# Patient Record
Sex: MALE | Race: WHITE | NOT HISPANIC OR LATINO | Employment: FULL TIME | URBAN - METROPOLITAN AREA
[De-identification: names, ages, dates, MRNs, and addresses within clinical notes are randomized per-mention and may not be internally consistent; named-entity substitution may affect disease eponyms.]

---

## 2023-02-09 PROBLEM — N20.0 KIDNEY STONES: Status: ACTIVE | Noted: 2023-02-09

## 2023-02-09 PROBLEM — G89.29 CHRONIC PAIN OF RIGHT ANKLE: Status: ACTIVE | Noted: 2023-02-09

## 2023-02-09 PROBLEM — B36.9 FUNGAL DERMATITIS: Status: ACTIVE | Noted: 2023-02-09

## 2023-02-09 PROBLEM — N20.1 RIGHT URETERAL STONE: Status: ACTIVE | Noted: 2023-02-09

## 2023-02-09 PROBLEM — M25.551 HIP PAIN, RIGHT: Status: ACTIVE | Noted: 2023-02-09

## 2023-02-09 PROBLEM — M25.571 CHRONIC PAIN OF RIGHT ANKLE: Status: ACTIVE | Noted: 2023-02-09

## 2023-02-09 PROBLEM — E66.812 CLASS 2 SEVERE OBESITY DUE TO EXCESS CALORIES WITH SERIOUS COMORBIDITY AND BODY MASS INDEX (BMI) OF 37.0 TO 37.9 IN ADULT: Status: ACTIVE | Noted: 2023-02-09

## 2023-02-09 PROBLEM — I10 BENIGN ESSENTIAL HYPERTENSION: Status: ACTIVE | Noted: 2023-02-09

## 2023-02-09 PROBLEM — E55.9 VITAMIN D DEFICIENCY: Status: ACTIVE | Noted: 2023-02-09

## 2023-02-09 PROBLEM — M54.40 LOW BACK PAIN WITH SCIATICA: Status: ACTIVE | Noted: 2023-02-09

## 2023-02-09 PROBLEM — E66.01 CLASS 2 SEVERE OBESITY DUE TO EXCESS CALORIES WITH SERIOUS COMORBIDITY AND BODY MASS INDEX (BMI) OF 37.0 TO 37.9 IN ADULT (MULTI): Status: ACTIVE | Noted: 2023-02-09

## 2023-02-09 RX ORDER — CLOTRIMAZOLE AND BETAMETHASONE DIPROPIONATE 10; .64 MG/G; MG/G
CREAM TOPICAL 2 TIMES DAILY
COMMUNITY

## 2023-02-09 RX ORDER — LISINOPRIL 10 MG/1
10 TABLET ORAL DAILY
COMMUNITY
End: 2023-04-18 | Stop reason: SDUPTHER

## 2023-03-13 LAB
ALANINE AMINOTRANSFERASE (SGPT) (U/L) IN SER/PLAS: 17 U/L (ref 10–52)
ALBUMIN (G/DL) IN SER/PLAS: 3.8 G/DL (ref 3.4–5)
ALKALINE PHOSPHATASE (U/L) IN SER/PLAS: 72 U/L (ref 33–120)
ANION GAP IN SER/PLAS: 8 MMOL/L (ref 10–20)
ASPARTATE AMINOTRANSFERASE (SGOT) (U/L) IN SER/PLAS: 14 U/L (ref 9–39)
BILIRUBIN TOTAL (MG/DL) IN SER/PLAS: 0.7 MG/DL (ref 0–1.2)
CALCIDIOL (25 OH VITAMIN D3) (NG/ML) IN SER/PLAS: 11 NG/ML
CALCIUM (MG/DL) IN SER/PLAS: 8.7 MG/DL (ref 8.6–10.3)
CARBON DIOXIDE, TOTAL (MMOL/L) IN SER/PLAS: 30 MMOL/L (ref 21–32)
CHLORIDE (MMOL/L) IN SER/PLAS: 106 MMOL/L (ref 98–107)
CHOLESTEROL (MG/DL) IN SER/PLAS: 144 MG/DL (ref 0–199)
CHOLESTEROL IN HDL (MG/DL) IN SER/PLAS: 36.5 MG/DL
CHOLESTEROL/HDL RATIO: 3.9
CREATININE (MG/DL) IN SER/PLAS: 0.97 MG/DL (ref 0.5–1.3)
ERYTHROCYTE DISTRIBUTION WIDTH (RATIO) BY AUTOMATED COUNT: 13 % (ref 11.5–14.5)
ERYTHROCYTE MEAN CORPUSCULAR HEMOGLOBIN CONCENTRATION (G/DL) BY AUTOMATED: 33.3 G/DL (ref 32–36)
ERYTHROCYTE MEAN CORPUSCULAR VOLUME (FL) BY AUTOMATED COUNT: 86 FL (ref 80–100)
ERYTHROCYTES (10*6/UL) IN BLOOD BY AUTOMATED COUNT: 5.63 X10E12/L (ref 4.5–5.9)
GFR MALE: >90 ML/MIN/1.73M2
GLUCOSE (MG/DL) IN SER/PLAS: 98 MG/DL (ref 74–99)
HEMATOCRIT (%) IN BLOOD BY AUTOMATED COUNT: 48.3 % (ref 41–52)
HEMOGLOBIN (G/DL) IN BLOOD: 16.1 G/DL (ref 13.5–17.5)
LDL: 80 MG/DL (ref 0–99)
LEUKOCYTES (10*3/UL) IN BLOOD BY AUTOMATED COUNT: 7.6 X10E9/L (ref 4.4–11.3)
PLATELETS (10*3/UL) IN BLOOD AUTOMATED COUNT: 269 X10E9/L (ref 150–450)
POTASSIUM (MMOL/L) IN SER/PLAS: 4.4 MMOL/L (ref 3.5–5.3)
PROTEIN TOTAL: 6.7 G/DL (ref 6.4–8.2)
SODIUM (MMOL/L) IN SER/PLAS: 140 MMOL/L (ref 136–145)
THYROTROPIN (MIU/L) IN SER/PLAS BY DETECTION LIMIT <= 0.05 MIU/L: 1.6 MIU/L (ref 0.44–3.98)
TRIGLYCERIDE (MG/DL) IN SER/PLAS: 136 MG/DL (ref 0–149)
UREA NITROGEN (MG/DL) IN SER/PLAS: 11 MG/DL (ref 6–23)
VLDL: 27 MG/DL (ref 0–40)

## 2023-03-17 ENCOUNTER — TELEPHONE (OUTPATIENT)
Dept: PRIMARY CARE | Facility: CLINIC | Age: 43
End: 2023-03-17
Payer: COMMERCIAL

## 2023-03-21 LAB
APPEARANCE, URINE: CLEAR
BILIRUBIN, URINE: NEGATIVE
BLOOD, URINE: NEGATIVE
COLOR, URINE: YELLOW
GLUCOSE, URINE: NEGATIVE MG/DL
KETONES, URINE: ABNORMAL MG/DL
LEUKOCYTE ESTERASE, URINE: NEGATIVE
NITRITE, URINE: NEGATIVE
PH, URINE: 6 (ref 5–8)
PROTEIN, URINE: NEGATIVE MG/DL
SPECIFIC GRAVITY, URINE: 1.02 (ref 1–1.03)
UROBILINOGEN, URINE: <2 MG/DL (ref 0–1.9)

## 2023-03-22 ENCOUNTER — OFFICE VISIT (OUTPATIENT)
Dept: PRIMARY CARE | Facility: CLINIC | Age: 43
End: 2023-03-22
Payer: COMMERCIAL

## 2023-03-22 VITALS
RESPIRATION RATE: 15 BRPM | BODY MASS INDEX: 37.49 KG/M2 | SYSTOLIC BLOOD PRESSURE: 132 MMHG | TEMPERATURE: 96.2 F | OXYGEN SATURATION: 96 % | WEIGHT: 225 LBS | HEART RATE: 76 BPM | DIASTOLIC BLOOD PRESSURE: 87 MMHG | HEIGHT: 65 IN

## 2023-03-22 DIAGNOSIS — M77.8 ENTHESOPATHY OF RIGHT FOOT: ICD-10-CM

## 2023-03-22 DIAGNOSIS — I10 BENIGN ESSENTIAL HYPERTENSION: Primary | ICD-10-CM

## 2023-03-22 DIAGNOSIS — E55.9 VITAMIN D DEFICIENCY: ICD-10-CM

## 2023-03-22 DIAGNOSIS — M77.31 HEEL SPUR, RIGHT: ICD-10-CM

## 2023-03-22 DIAGNOSIS — M54.40 CHRONIC RIGHT-SIDED LOW BACK PAIN WITH SCIATICA, SCIATICA LATERALITY UNSPECIFIED: ICD-10-CM

## 2023-03-22 DIAGNOSIS — G89.29 CHRONIC RIGHT-SIDED LOW BACK PAIN WITH SCIATICA, SCIATICA LATERALITY UNSPECIFIED: ICD-10-CM

## 2023-03-22 DIAGNOSIS — M47.817 FACET ARTHRITIS OF LUMBOSACRAL REGION: ICD-10-CM

## 2023-03-22 DIAGNOSIS — M25.571 CHRONIC PAIN OF RIGHT ANKLE: ICD-10-CM

## 2023-03-22 DIAGNOSIS — M16.11 ARTHRITIS OF RIGHT HIP: ICD-10-CM

## 2023-03-22 DIAGNOSIS — G89.29 CHRONIC PAIN OF RIGHT ANKLE: ICD-10-CM

## 2023-03-22 PROCEDURE — 3075F SYST BP GE 130 - 139MM HG: CPT | Performed by: FAMILY MEDICINE

## 2023-03-22 PROCEDURE — 3079F DIAST BP 80-89 MM HG: CPT | Performed by: FAMILY MEDICINE

## 2023-03-22 PROCEDURE — 1036F TOBACCO NON-USER: CPT | Performed by: FAMILY MEDICINE

## 2023-03-22 PROCEDURE — 99214 OFFICE O/P EST MOD 30 MIN: CPT | Performed by: FAMILY MEDICINE

## 2023-03-22 RX ORDER — ACETAMINOPHEN 500 MG
5000 TABLET ORAL DAILY
COMMUNITY

## 2023-03-22 ASSESSMENT — ENCOUNTER SYMPTOMS
SHORTNESS OF BREATH: 0
CHILLS: 0
CHEST TIGHTNESS: 0
ABDOMINAL PAIN: 0
FEVER: 0
PALPITATIONS: 0
ARTHRALGIAS: 0
CONFUSION: 0

## 2023-03-22 NOTE — PROGRESS NOTES
"Subjective   Patient ID: Maicol Haley is a 42 y.o. male who presents for Follow-up (6 week).    HPI patient today for follow-up of ongoing healthcare issues and review of recent diagnostic studies.  States he is tolerating the lisinopril without any issues.  He just completed his EMG earlier this week.    Review of Systems   Constitutional:  Negative for chills and fever.   HENT:  Negative for congestion and ear pain.    Eyes:  Negative for visual disturbance.   Respiratory:  Negative for chest tightness and shortness of breath.    Cardiovascular:  Negative for chest pain and palpitations.   Gastrointestinal:  Negative for abdominal pain.   Musculoskeletal:  Negative for arthralgias.        Right ankle and heel pain   Skin:  Negative for pallor.   Psychiatric/Behavioral:  Negative for confusion.        Objective   /87   Pulse 76   Temp 35.7 °C (96.2 °F)   Resp 15   Ht 1.651 m (5' 5\")   Wt 102 kg (225 lb)   SpO2 96%   BMI 37.44 kg/m²     Physical Exam  Vitals and nursing note reviewed.   Constitutional:       General: He is not in acute distress.     Appearance: Normal appearance. He is not ill-appearing.   HENT:      Head: Normocephalic and atraumatic.      Right Ear: Tympanic membrane, ear canal and external ear normal.      Left Ear: Tympanic membrane, ear canal and external ear normal.      Mouth/Throat:      Pharynx: Oropharynx is clear.   Eyes:      Extraocular Movements: Extraocular movements intact.   Cardiovascular:      Rate and Rhythm: Normal rate and regular rhythm.      Pulses: Normal pulses.      Heart sounds: Normal heart sounds.   Pulmonary:      Effort: Pulmonary effort is normal.      Breath sounds: Normal breath sounds.   Abdominal:      General: Abdomen is flat. Bowel sounds are normal.      Palpations: Abdomen is soft.      Tenderness: There is no abdominal tenderness.   Musculoskeletal:         General: Tenderness present. Normal range of motion.      Cervical " back: Neck supple.      Comments: Mild tenderness to palpation right heel.  Dorsiflexion plantarflexion grossly intact.   Skin:     General: Skin is warm.   Neurological:      Mental Status: He is alert and oriented to person, place, and time. Mental status is at baseline.   Psychiatric:         Mood and Affect: Mood normal.       Recent Results (from the past 1344 hour(s))   Vitamin D, Total    Collection Time: 03/13/23 10:14 AM   Result Value Ref Range    Vitamin D, 25-Hydroxy 11 (A) ng/mL   CBC    Collection Time: 03/13/23 10:14 AM   Result Value Ref Range    WBC 7.6 4.4 - 11.3 x10E9/L    RBC 5.63 4.50 - 5.90 x10E12/L    Hemoglobin 16.1 13.5 - 17.5 g/dL    Hematocrit 48.3 41.0 - 52.0 %    MCV 86 80 - 100 fL    MCHC 33.3 32.0 - 36.0 g/dL    Platelets 269 150 - 450 x10E9/L    RDW 13.0 11.5 - 14.5 %   TSH with reflex to Free T4 if abnormal    Collection Time: 03/13/23 10:14 AM   Result Value Ref Range    TSH 1.60 0.44 - 3.98 mIU/L   Comprehensive Metabolic Panel    Collection Time: 03/13/23 10:14 AM   Result Value Ref Range    Glucose 98 74 - 99 mg/dL    Sodium 140 136 - 145 mmol/L    Potassium 4.4 3.5 - 5.3 mmol/L    Chloride 106 98 - 107 mmol/L    Bicarbonate 30 21 - 32 mmol/L    Anion Gap 8 (L) 10 - 20 mmol/L    Urea Nitrogen 11 6 - 23 mg/dL    Creatinine 0.97 0.50 - 1.30 mg/dL    GFR MALE >90 >90 mL/min/1.73m2    Calcium 8.7 8.6 - 10.3 mg/dL    Albumin 3.8 3.4 - 5.0 g/dL    Alkaline Phosphatase 72 33 - 120 U/L    Total Protein 6.7 6.4 - 8.2 g/dL    AST 14 9 - 39 U/L    Total Bilirubin 0.7 0.0 - 1.2 mg/dL    ALT (SGPT) 17 10 - 52 U/L   Lipid Panel    Collection Time: 03/13/23 10:14 AM   Result Value Ref Range    Cholesterol 144 0 - 199 mg/dL    HDL 36.5 (A) mg/dL    Cholesterol/HDL Ratio 3.9     LDL 80 0 - 99 mg/dL    VLDL 27 0 - 40 mg/dL    Triglycerides 136 0 - 149 mg/dL   Urinalysis with Reflex Microscopic    Collection Time: 03/21/23 11:22 AM   Result Value Ref Range    Color, Urine YELLOW STRAW,YELLOW     Appearance, Urine CLEAR CLEAR    Specific Gravity, Urine 1.025 1.005 - 1.035    pH, Urine 6.0 5.0 - 8.0    Protein, Urine NEGATIVE NEGATIVE mg/dL    Glucose, Urine NEGATIVE NEGATIVE mg/dL    Blood, Urine NEGATIVE NEGATIVE    Ketones, Urine TRACE (A) NEGATIVE mg/dL    Bilirubin, Urine NEGATIVE NEGATIVE    Urobilinogen, Urine <2.0 0.0 - 1.9 mg/dL    Nitrite, Urine NEGATIVE NEGATIVE    Leukocyte Esterase, Urine NEGATIVE NEGATIVE     Return to office 4 months with repeat fasting labs if he changes his mind with regards to referral to specialist for his heel spurs he is to notify the office.  Call for EMG results within 2 weeks if he has not heard from our office.  Assessment/Plan   Problem List Items Addressed This Visit       Benign essential hypertension - Primary     HTN improved continue lisinopril 10 mg daily         Relevant Orders    Comprehensive Metabolic Panel    Vitamin D 1,25 Dihydroxy    Follow Up In Primary Care    Chronic pain of right ankle     X-ray revealed mild arthritis conservative treatment for now with over-the-counter anti-inflammatory medicine when needed         Low back pain with sciatica     EMG results are pending further plans to be determined         Vitamin D deficiency     D deficiency start vitamin D3 5000 units daily         Relevant Orders    Comprehensive Metabolic Panel    Vitamin D 1,25 Dihydroxy    Follow Up In Primary Care    Heel spur, right     Moderate heel spur plantar surface we discussed conservative treatment with heel cup insert in over-the-counter anti-inflammatory medicines as well as proper shoes stretching and modification of activity.  Offered referral to a specialist for second opinion he declined at this time         Enthesopathy of right foot     X-ray of ankle reviewed showing enthesophyte at level of Achilles tendon posterior calcaneus.  We discussed conservative treatment with NSAIDs heel cup modification of activity.  He declined referral to specialist.          Facet arthritis of lumbosacral region     X-ray lumbosacral spine reviewed showing mild facet arthritis         Arthritis of right hip     Hip x-ray reveals mild arthritis continue conservative treatment for now

## 2023-03-22 NOTE — ASSESSMENT & PLAN NOTE
X-ray revealed mild arthritis conservative treatment for now with over-the-counter anti-inflammatory medicine when needed

## 2023-03-22 NOTE — ASSESSMENT & PLAN NOTE
X-ray of ankle reviewed showing enthesophyte at level of Achilles tendon posterior calcaneus.  We discussed conservative treatment with NSAIDs heel cup modification of activity.  He declined referral to specialist.

## 2023-04-18 DIAGNOSIS — I10 BENIGN ESSENTIAL HYPERTENSION: ICD-10-CM

## 2023-04-18 RX ORDER — LISINOPRIL 10 MG/1
10 TABLET ORAL DAILY
Qty: 90 TABLET | Refills: 3 | Status: SHIPPED | OUTPATIENT
Start: 2023-04-18 | End: 2024-04-17

## 2023-07-24 ENCOUNTER — OFFICE VISIT (OUTPATIENT)
Dept: PRIMARY CARE | Facility: CLINIC | Age: 43
End: 2023-07-24
Payer: COMMERCIAL

## 2023-07-24 ENCOUNTER — LAB (OUTPATIENT)
Dept: LAB | Facility: LAB | Age: 43
End: 2023-07-24
Payer: COMMERCIAL

## 2023-07-24 VITALS
RESPIRATION RATE: 14 BRPM | TEMPERATURE: 98.3 F | OXYGEN SATURATION: 96 % | HEART RATE: 63 BPM | SYSTOLIC BLOOD PRESSURE: 131 MMHG | WEIGHT: 222 LBS | BODY MASS INDEX: 36.94 KG/M2 | DIASTOLIC BLOOD PRESSURE: 87 MMHG

## 2023-07-24 DIAGNOSIS — E55.9 VITAMIN D DEFICIENCY: ICD-10-CM

## 2023-07-24 DIAGNOSIS — I10 BENIGN ESSENTIAL HYPERTENSION: ICD-10-CM

## 2023-07-24 DIAGNOSIS — M25.551 HIP PAIN, RIGHT: Primary | ICD-10-CM

## 2023-07-24 LAB
ALANINE AMINOTRANSFERASE (SGPT) (U/L) IN SER/PLAS: 18 U/L (ref 10–52)
ALBUMIN (G/DL) IN SER/PLAS: 4.2 G/DL (ref 3.4–5)
ALKALINE PHOSPHATASE (U/L) IN SER/PLAS: 76 U/L (ref 33–120)
ANION GAP IN SER/PLAS: 9 MMOL/L (ref 10–20)
ASPARTATE AMINOTRANSFERASE (SGOT) (U/L) IN SER/PLAS: 15 U/L (ref 9–39)
BILIRUBIN TOTAL (MG/DL) IN SER/PLAS: 0.8 MG/DL (ref 0–1.2)
CALCIUM (MG/DL) IN SER/PLAS: 9.1 MG/DL (ref 8.6–10.3)
CARBON DIOXIDE, TOTAL (MMOL/L) IN SER/PLAS: 29 MMOL/L (ref 21–32)
CHLORIDE (MMOL/L) IN SER/PLAS: 105 MMOL/L (ref 98–107)
CREATININE (MG/DL) IN SER/PLAS: 0.93 MG/DL (ref 0.5–1.3)
GFR MALE: >90 ML/MIN/1.73M2
GLUCOSE (MG/DL) IN SER/PLAS: 95 MG/DL (ref 74–99)
POTASSIUM (MMOL/L) IN SER/PLAS: 5 MMOL/L (ref 3.5–5.3)
PROTEIN TOTAL: 6.8 G/DL (ref 6.4–8.2)
SODIUM (MMOL/L) IN SER/PLAS: 138 MMOL/L (ref 136–145)
UREA NITROGEN (MG/DL) IN SER/PLAS: 18 MG/DL (ref 6–23)

## 2023-07-24 PROCEDURE — 1036F TOBACCO NON-USER: CPT | Performed by: FAMILY MEDICINE

## 2023-07-24 PROCEDURE — 36415 COLL VENOUS BLD VENIPUNCTURE: CPT

## 2023-07-24 PROCEDURE — 80053 COMPREHEN METABOLIC PANEL: CPT

## 2023-07-24 PROCEDURE — 99213 OFFICE O/P EST LOW 20 MIN: CPT | Performed by: FAMILY MEDICINE

## 2023-07-24 PROCEDURE — 3075F SYST BP GE 130 - 139MM HG: CPT | Performed by: FAMILY MEDICINE

## 2023-07-24 PROCEDURE — 3079F DIAST BP 80-89 MM HG: CPT | Performed by: FAMILY MEDICINE

## 2023-07-24 PROCEDURE — 82652 VIT D 1 25-DIHYDROXY: CPT

## 2023-07-24 NOTE — ASSESSMENT & PLAN NOTE
Currently stable.  No new symptoms have developed.  Patient says he actually went on a bike ride over the weekend and that went well.

## 2023-07-24 NOTE — PROGRESS NOTES
Subjective   Patient ID: Maicol Haley is a 42 y.o. male who presents for Follow-up (4 month).    HPI patient today for follow-up did not have any of his blood work done states he will get it done today and call for results.  Regarding his hip says he has some good days and bad days occasion gets some discomfort around the posterior aspect of the right hip with some radiation into the thigh.  She says actually going on a bike ride recently seem to give him some relief of his symptoms.  EMG done earlier this year was negative.    Review of Systems   Musculoskeletal:         Right hip pain       Objective   /87   Pulse 63   Temp 36.8 °C (98.3 °F)   Resp 14   Wt 101 kg (222 lb)   SpO2 96%   BMI 36.94 kg/m²     Physical Exam  Vitals and nursing note reviewed.   Constitutional:       General: He is not in acute distress.     Appearance: Normal appearance. He is not ill-appearing.   HENT:      Head: Normocephalic and atraumatic.      Right Ear: Tympanic membrane, ear canal and external ear normal.      Left Ear: Tympanic membrane, ear canal and external ear normal.      Mouth/Throat:      Pharynx: Oropharynx is clear.   Eyes:      Extraocular Movements: Extraocular movements intact.   Cardiovascular:      Rate and Rhythm: Normal rate and regular rhythm.      Pulses: Normal pulses.      Heart sounds: Normal heart sounds.   Pulmonary:      Effort: Pulmonary effort is normal.      Breath sounds: Normal breath sounds.   Abdominal:      General: Abdomen is flat. Bowel sounds are normal.      Palpations: Abdomen is soft.      Tenderness: There is no abdominal tenderness.   Musculoskeletal:         General: Normal range of motion.      Cervical back: Neck supple.   Skin:     General: Skin is warm.   Neurological:      Mental Status: He is alert and oriented to person, place, and time. Mental status is at baseline.   Psychiatric:         Mood and Affect: Mood normal.     Patient to get blood work  completed  Offered referral to physical therapy and/or orthopedics for second opinion regarding his hip.  X-rays of the low back and hip were essentially unremarkable other than for mild degenerative changes.  At this point he is not interested in pursuing either 1 of these.  He will let us know if he changes his mind    Return to office 4 months with repeat fasting labs    Assessment/Plan   Problem List Items Addressed This Visit       Benign essential hypertension     Stable continue lisinopril 10 mg daily         Relevant Orders    Follow Up In Primary Care - Established    Comprehensive Metabolic Panel    Vitamin D, Total    Hip pain, right - Primary     Currently stable.  No new symptoms have developed.  Patient says he actually went on a bike ride over the weekend and that went well.         Vitamin D deficiency     Continue to monitor and         Relevant Orders    Follow Up In Primary Care - Established    Comprehensive Metabolic Panel    Vitamin D, Total

## 2023-07-27 LAB — VITAMIN D 1,25-DIHYDROXY: 57.7 PG/ML (ref 19.9–79.3)

## 2023-11-14 ENCOUNTER — APPOINTMENT (OUTPATIENT)
Dept: PRIMARY CARE | Facility: CLINIC | Age: 43
End: 2023-11-14
Payer: COMMERCIAL

## 2023-12-20 ENCOUNTER — HOSPITAL ENCOUNTER (EMERGENCY)
Facility: HOSPITAL | Age: 43
Discharge: HOME | End: 2023-12-21
Payer: COMMERCIAL

## 2023-12-20 VITALS
BODY MASS INDEX: 36.65 KG/M2 | DIASTOLIC BLOOD PRESSURE: 88 MMHG | WEIGHT: 220 LBS | RESPIRATION RATE: 20 BRPM | SYSTOLIC BLOOD PRESSURE: 143 MMHG | TEMPERATURE: 98.5 F | HEIGHT: 65 IN | HEART RATE: 72 BPM

## 2023-12-20 DIAGNOSIS — N20.2 CALCULUS OF KIDNEY AND URETER: Primary | ICD-10-CM

## 2023-12-20 LAB
BASOPHILS # BLD AUTO: 0.04 X10*3/UL (ref 0–0.1)
BASOPHILS NFR BLD AUTO: 0.4 %
EOSINOPHIL # BLD AUTO: 0 X10*3/UL (ref 0–0.7)
EOSINOPHIL NFR BLD AUTO: 0 %
ERYTHROCYTE [DISTWIDTH] IN BLOOD BY AUTOMATED COUNT: 13.3 % (ref 11.5–14.5)
HCT VFR BLD AUTO: 46.2 % (ref 41–52)
HGB BLD-MCNC: 15.7 G/DL (ref 13.5–17.5)
IMM GRANULOCYTES # BLD AUTO: 0.05 X10*3/UL (ref 0–0.7)
IMM GRANULOCYTES NFR BLD AUTO: 0.4 % (ref 0–0.9)
LYMPHOCYTES # BLD AUTO: 1.55 X10*3/UL (ref 1.2–4.8)
LYMPHOCYTES NFR BLD AUTO: 13.8 %
MCH RBC QN AUTO: 28.6 PG (ref 26–34)
MCHC RBC AUTO-ENTMCNC: 34 G/DL (ref 32–36)
MCV RBC AUTO: 84 FL (ref 80–100)
MONOCYTES # BLD AUTO: 0.89 X10*3/UL (ref 0.1–1)
MONOCYTES NFR BLD AUTO: 7.9 %
NEUTROPHILS # BLD AUTO: 8.73 X10*3/UL (ref 1.2–7.7)
NEUTROPHILS NFR BLD AUTO: 77.5 %
NRBC BLD-RTO: 0 /100 WBCS (ref 0–0)
PLATELET # BLD AUTO: 260 X10*3/UL (ref 150–450)
RBC # BLD AUTO: 5.49 X10*6/UL (ref 4.5–5.9)
WBC # BLD AUTO: 11.3 X10*3/UL (ref 4.4–11.3)

## 2023-12-20 PROCEDURE — 96375 TX/PRO/DX INJ NEW DRUG ADDON: CPT

## 2023-12-20 PROCEDURE — 99284 EMERGENCY DEPT VISIT MOD MDM: CPT

## 2023-12-20 PROCEDURE — 96361 HYDRATE IV INFUSION ADD-ON: CPT

## 2023-12-20 PROCEDURE — 87086 URINE CULTURE/COLONY COUNT: CPT | Mod: PORLAB

## 2023-12-20 PROCEDURE — 36415 COLL VENOUS BLD VENIPUNCTURE: CPT

## 2023-12-20 PROCEDURE — 85025 COMPLETE CBC W/AUTO DIFF WBC: CPT

## 2023-12-20 PROCEDURE — 81001 URINALYSIS AUTO W/SCOPE: CPT

## 2023-12-20 PROCEDURE — 2500000004 HC RX 250 GENERAL PHARMACY W/ HCPCS (ALT 636 FOR OP/ED)

## 2023-12-20 PROCEDURE — 83605 ASSAY OF LACTIC ACID: CPT

## 2023-12-20 PROCEDURE — 80053 COMPREHEN METABOLIC PANEL: CPT

## 2023-12-20 PROCEDURE — 96374 THER/PROPH/DIAG INJ IV PUSH: CPT

## 2023-12-20 RX ORDER — ONDANSETRON HYDROCHLORIDE 2 MG/ML
4 INJECTION, SOLUTION INTRAVENOUS ONCE
Status: COMPLETED | OUTPATIENT
Start: 2023-12-20 | End: 2023-12-20

## 2023-12-20 RX ORDER — KETOROLAC TROMETHAMINE 30 MG/ML
30 INJECTION, SOLUTION INTRAMUSCULAR; INTRAVENOUS ONCE
Status: COMPLETED | OUTPATIENT
Start: 2023-12-20 | End: 2023-12-20

## 2023-12-20 RX ADMIN — SODIUM CHLORIDE 1000 ML: 9 INJECTION, SOLUTION INTRAVENOUS at 23:53

## 2023-12-20 RX ADMIN — KETOROLAC TROMETHAMINE 30 MG: 30 INJECTION, SOLUTION INTRAMUSCULAR at 23:54

## 2023-12-20 RX ADMIN — ONDANSETRON 4 MG: 2 INJECTION INTRAMUSCULAR; INTRAVENOUS at 23:54

## 2023-12-20 ASSESSMENT — COLUMBIA-SUICIDE SEVERITY RATING SCALE - C-SSRS
1. IN THE PAST MONTH, HAVE YOU WISHED YOU WERE DEAD OR WISHED YOU COULD GO TO SLEEP AND NOT WAKE UP?: NO
2. HAVE YOU ACTUALLY HAD ANY THOUGHTS OF KILLING YOURSELF?: NO
6. HAVE YOU EVER DONE ANYTHING, STARTED TO DO ANYTHING, OR PREPARED TO DO ANYTHING TO END YOUR LIFE?: NO

## 2023-12-20 ASSESSMENT — PAIN SCALES - GENERAL: PAINLEVEL_OUTOF10: 2

## 2023-12-20 ASSESSMENT — PAIN DESCRIPTION - ORIENTATION: ORIENTATION: LEFT;LOWER

## 2023-12-20 ASSESSMENT — PAIN DESCRIPTION - DESCRIPTORS: DESCRIPTORS: SPASM

## 2023-12-20 ASSESSMENT — PAIN DESCRIPTION - PAIN TYPE: TYPE: ACUTE PAIN

## 2023-12-20 ASSESSMENT — PAIN - FUNCTIONAL ASSESSMENT: PAIN_FUNCTIONAL_ASSESSMENT: 0-10

## 2023-12-20 ASSESSMENT — PAIN DESCRIPTION - FREQUENCY: FREQUENCY: CONSTANT/CONTINUOUS

## 2023-12-20 ASSESSMENT — PAIN DESCRIPTION - LOCATION: LOCATION: BACK

## 2023-12-21 ENCOUNTER — APPOINTMENT (OUTPATIENT)
Dept: RADIOLOGY | Facility: HOSPITAL | Age: 43
End: 2023-12-21
Payer: COMMERCIAL

## 2023-12-21 ENCOUNTER — TELEPHONE (OUTPATIENT)
Dept: UROLOGY | Facility: CLINIC | Age: 43
End: 2023-12-21
Payer: COMMERCIAL

## 2023-12-21 LAB
ALBUMIN SERPL BCP-MCNC: 4.2 G/DL (ref 3.4–5)
ALP SERPL-CCNC: 76 U/L (ref 33–120)
ALT SERPL W P-5'-P-CCNC: 21 U/L (ref 10–52)
ANION GAP SERPL CALC-SCNC: 11 MMOL/L (ref 10–20)
APPEARANCE UR: ABNORMAL
AST SERPL W P-5'-P-CCNC: 19 U/L (ref 9–39)
BILIRUB SERPL-MCNC: 0.5 MG/DL (ref 0–1.2)
BILIRUB UR STRIP.AUTO-MCNC: NEGATIVE MG/DL
BUN SERPL-MCNC: 17 MG/DL (ref 6–23)
CALCIUM SERPL-MCNC: 9 MG/DL (ref 8.6–10.3)
CHLORIDE SERPL-SCNC: 104 MMOL/L (ref 98–107)
CO2 SERPL-SCNC: 27 MMOL/L (ref 21–32)
COLOR UR: YELLOW
CREAT SERPL-MCNC: 0.84 MG/DL (ref 0.5–1.3)
GFR SERPL CREATININE-BSD FRML MDRD: >90 ML/MIN/1.73M*2
GLUCOSE SERPL-MCNC: 95 MG/DL (ref 74–99)
GLUCOSE UR STRIP.AUTO-MCNC: NEGATIVE MG/DL
HOLD SPECIMEN: NORMAL
HYALINE CASTS #/AREA URNS AUTO: ABNORMAL /LPF
KETONES UR STRIP.AUTO-MCNC: ABNORMAL MG/DL
LACTATE SERPL-SCNC: 0.9 MMOL/L (ref 0.4–2)
LEUKOCYTE ESTERASE UR QL STRIP.AUTO: NEGATIVE
MUCOUS THREADS #/AREA URNS AUTO: ABNORMAL /LPF
NITRITE UR QL STRIP.AUTO: NEGATIVE
PH UR STRIP.AUTO: 5 [PH]
POTASSIUM SERPL-SCNC: 3.9 MMOL/L (ref 3.5–5.3)
PROT SERPL-MCNC: 7.3 G/DL (ref 6.4–8.2)
PROT UR STRIP.AUTO-MCNC: NEGATIVE MG/DL
RBC # UR STRIP.AUTO: ABNORMAL /UL
RBC #/AREA URNS AUTO: >20 /HPF
SODIUM SERPL-SCNC: 138 MMOL/L (ref 136–145)
SP GR UR STRIP.AUTO: 1.02
UROBILINOGEN UR STRIP.AUTO-MCNC: <2 MG/DL
WBC #/AREA URNS AUTO: ABNORMAL /HPF

## 2023-12-21 PROCEDURE — 74176 CT ABD & PELVIS W/O CONTRAST: CPT | Performed by: RADIOLOGY

## 2023-12-21 PROCEDURE — 74176 CT ABD & PELVIS W/O CONTRAST: CPT

## 2023-12-21 PROCEDURE — 2500000001 HC RX 250 WO HCPCS SELF ADMINISTERED DRUGS (ALT 637 FOR MEDICARE OP)

## 2023-12-21 RX ORDER — CIPROFLOXACIN 500 MG/1
500 TABLET ORAL 2 TIMES DAILY
Qty: 14 TABLET | Refills: 0 | Status: SHIPPED | OUTPATIENT
Start: 2023-12-21 | End: 2023-12-28

## 2023-12-21 RX ORDER — ONDANSETRON 4 MG/1
4 TABLET, ORALLY DISINTEGRATING ORAL EVERY 8 HOURS PRN
Qty: 20 TABLET | Refills: 0 | Status: SHIPPED | OUTPATIENT
Start: 2023-12-21 | End: 2023-12-28

## 2023-12-21 RX ORDER — CIPROFLOXACIN 500 MG/1
500 TABLET ORAL ONCE
Status: COMPLETED | OUTPATIENT
Start: 2023-12-21 | End: 2023-12-21

## 2023-12-21 RX ORDER — TAMSULOSIN HYDROCHLORIDE 0.4 MG/1
0.4 CAPSULE ORAL DAILY
Status: DISCONTINUED | OUTPATIENT
Start: 2023-12-21 | End: 2023-12-21 | Stop reason: HOSPADM

## 2023-12-21 RX ORDER — KETOROLAC TROMETHAMINE 10 MG/1
10 TABLET, FILM COATED ORAL EVERY 6 HOURS PRN
Qty: 24 TABLET | Refills: 0 | Status: SHIPPED | OUTPATIENT
Start: 2023-12-21

## 2023-12-21 RX ORDER — TAMSULOSIN HYDROCHLORIDE 0.4 MG/1
0.4 CAPSULE ORAL DAILY
Qty: 20 CAPSULE | Refills: 0 | Status: SHIPPED | OUTPATIENT
Start: 2023-12-21

## 2023-12-21 RX ADMIN — CIPROFLOXACIN 500 MG: 500 TABLET, FILM COATED ORAL at 02:02

## 2023-12-21 NOTE — ED PROVIDER NOTES
Chief Complaint   Patient presents with    Flank Pain     Lt flank pain  had episode last night and got better  then 2100 tonight  sudden pain lt flank  hx of stones  nothing for pain        43-year-old male arrives to the emergency department the chief complaint of left flank pain.  Patient states that yesterday while he was at work he felt a twinge in the left flank, he got up to an 8 out of 10 last night, and as he slept overnight while taking ibuprofen and Tylenol the pain relieved down to a 2 out of 10, the patient then went to work and the pain got up to a 10 out of 10, the patient was not able to function at work and came to the emergency department.  Since that time the pain has now subsided back to a 2 out of 10.  The patient denies any significant nausea or any vomiting.  The patient does have CVA tenderness in the left.  Patient has a history of kidney stones with the last being 2 years ago.  The patient otherwise exam is largely nontoxic, patient takes lisinopril for blood pressure and no other medicines on a daily basis.      History provided by:  Patient   used: No         PmHx, PsHx, Allergies, Family Hx, social Hx reviewed as documented    A complete 10 point review of systems was performed and is negative except for as mentioned in the HPI.    Physical Exam:    General: Patient is AAOx3, appears well developed, well nourished, is a good historian, answers questions appropriately    HEENT: head normocephalic, atraumatic, PERRLA, EOMs intact, oropharynx without erythema or exudate, buccal mucosa intact without lesions, TMs unremarkable, nose is patent bilateral    Neck: supple, full ROM, negative for lymphadenopathy, JVD, thyromegaly, tracheal deviation, nuccal rigidity    Pulmonary: CTAB, no accessory muscle use, able to speak full clear sentences    Cardiac: HRRR, no murmurs, rubs or gallops    GI: Left flank pain with CVA tenderness, otherwise abdomen soft, non-tender,  non-distended, BS + x 4, no masses or organomegaly, no guarding or CVA tenderness noted, negative fitch's, mcburney's    Musculoskeletal: full weight bearing, SHERIDAN, no joint effusions, clubbing or edema noted    Skin: intact, no lesions or rashes noted, turgor is good.    Neuro: patient follow commands, cranial nerves 2-12 grossly intact, motor strengths 5/5 upper and lower extremities, DTR's and sensation are symmetrical. No focal deficits.    Rectal/: No urinary burning, urgency, change in frequency.  Patient has no rectal complaints        Medical Decision Making  This patient was seen in the emergency department with an attending physician available at all times throughout their ED course    Primary consideration for the patient given his presentation and his history would be a kidney stone on the left side, other consideration be hydronephrosis, pyelonephritis, or other nephro pathology.  Diagnostic blood work, urinalysis, CT abdomen pelvis without IV contrast will be used to further evaluate.  For patient's initial symptoms, patient given 30 mg of IV Toradol, 4 mg of IV Zofran, 1 L of normal saline    The patient's diagnostic blood work is negative for any acute abnormality or leukocytosis, the patient's lactic acid level is negative.  The patient's urinalysis shows a large amount of blood, likely contributing to the white blood cell count seen in the urinalysis.  It will be sent off to culture for further evaluation.  Through the preference of Dr. Grimes the patient's urologist, the patient will be started on ciprofloxacin 500 mg twice a day for 7 days given the first dose here in the emergency department.  Patient also given first dose of tamsulosin.    For symptom management at home given the patient is requesting discharge at this time, patient will be given Toradol, Zofran, tamsulosin, ciprofloxacin.    The patient will follow-up with his established urologist  for further evaluation and definitive  care calling tomorrow to establish an appointment.    Patient is amenable to the plan of discharge as outlined above, all patient's questions pertaining to their ED course were answered in their entirety.  Strict return precautions were discussed with the patient and they verbalized understanding.  Further, it was made clear to the patient that from an emergent basis, all effort and testing was done to eliminate any imminent dangerous or potentially dangerous conditions of the patient however if their symptoms get much worse or feel life-threatening, they are to return to the emergency department or call 911 immediately.    Amount and/or Complexity of Data Reviewed  Labs: ordered. Decision-making details documented in ED Course.  Radiology: ordered. Decision-making details documented in ED Course.       Diagnoses as of 12/21/23 0224   Calculus of kidney and ureter       The patient has had the following imaging during this ER visit: CT ABDOMEN PELVIS WO IV CONTRAST     Patient History   Past Medical History:   Diagnosis Date    Hypertension      Past Surgical History:   Procedure Laterality Date    OTHER SURGICAL HISTORY  06/03/2021    Cholecystectomy     Family History   Problem Relation Name Age of Onset    Diabetes Mother      Hypertension Mother      Esophageal cancer Father      Hypertension Maternal Grandfather      Other (Cardiac disorder) Other      Diabetes Other      Other (Malignant Neoplasm) Other      Other (Hypertension, Benign) Other       Social History     Tobacco Use    Smoking status: Former     Types: Cigarettes     Quit date: 2013     Years since quitting: 10.9    Smokeless tobacco: Never   Vaping Use    Vaping Use: Never used   Substance Use Topics    Alcohol use: Yes     Alcohol/week: 1.0 standard drink of alcohol     Types: 1 Cans of beer per week    Drug use: Never       ED Triage Vitals [12/20/23 2212]   Temp Heart Rate Resp BP   36.9 °C (98.5 °F) 72 20 143/88      SpO2 Temp Source Heart  "Rate Source Patient Position   -- Tympanic Monitor Sitting      BP Location FiO2 (%)     Left arm --       Vitals:    12/20/23 2212   BP: 143/88   BP Location: Left arm   Patient Position: Sitting   Pulse: 72   Resp: 20   Temp: 36.9 °C (98.5 °F)   TempSrc: Tympanic   Weight: 99.8 kg (220 lb)   Height: 1.651 m (5' 5\")               BHASKAR Arias-CNP  12/21/23 0224    "

## 2023-12-21 NOTE — TELEPHONE ENCOUNTER
Pt;'s wife is calling in wanting to know what he should do, he has a 5mm stone and was released from the hospital last night around 3am. I didn't know if you wanted to schedule him for surgery due to his past kidney stone history or if you would like to see him sooner.

## 2023-12-22 LAB — BACTERIA UR CULT: NO GROWTH

## 2023-12-28 ENCOUNTER — OFFICE VISIT (OUTPATIENT)
Dept: UROLOGY | Facility: CLINIC | Age: 43
End: 2023-12-28
Payer: COMMERCIAL

## 2023-12-28 VITALS
SYSTOLIC BLOOD PRESSURE: 167 MMHG | WEIGHT: 220 LBS | HEIGHT: 65 IN | DIASTOLIC BLOOD PRESSURE: 105 MMHG | HEART RATE: 80 BPM | BODY MASS INDEX: 36.65 KG/M2

## 2023-12-28 DIAGNOSIS — N20.1 URETERAL STONE: Primary | ICD-10-CM

## 2023-12-28 DIAGNOSIS — N20.0 KIDNEY STONES: ICD-10-CM

## 2023-12-28 LAB
POC BILIRUBIN, URINE: NEGATIVE
POC BLOOD, URINE: ABNORMAL
POC GLUCOSE, URINE: NEGATIVE MG/DL
POC KETONES, URINE: NEGATIVE MG/DL
POC LEUKOCYTES, URINE: NEGATIVE
POC NITRITE,URINE: NEGATIVE
POC PH, URINE: 6 PH
POC PROTEIN, URINE: NEGATIVE MG/DL
POC SPECIFIC GRAVITY, URINE: 1.01
POC UROBILINOGEN, URINE: 0.2 EU/DL

## 2023-12-28 PROCEDURE — 3077F SYST BP >= 140 MM HG: CPT | Performed by: UROLOGY

## 2023-12-28 PROCEDURE — 3080F DIAST BP >= 90 MM HG: CPT | Performed by: UROLOGY

## 2023-12-28 PROCEDURE — 99214 OFFICE O/P EST MOD 30 MIN: CPT | Performed by: UROLOGY

## 2023-12-28 PROCEDURE — 81003 URINALYSIS AUTO W/O SCOPE: CPT | Performed by: UROLOGY

## 2023-12-28 PROCEDURE — 1036F TOBACCO NON-USER: CPT | Performed by: UROLOGY

## 2023-12-28 NOTE — H&P (VIEW-ONLY)
12/28/2023  Intermittent left flank pain for a week    CT scan demonstrated a 3 mm left mid ureteral stone      I talked to patient about options for the kidney stones, #1 watchful waiting, #2 surgical intervention, cystoscopy ureteroscopy holmium laser lithotripsy and stent insertion. All questions were answered, patient expressed understanding and agreed to the plan.    Impression  Left flank pain  Left ureteral stone    Plan:  Cystoscopy ureteroscopy holmium laser lithotripsy and left stent insertion;  Cipro 500 mg twice a day x7 days;  Norco 5/325 one to 2 tablets every 4-6 hours x30;  Flomax 0.4 mg daily at bedtime x7;(patient has medication at home)  Return to office 2 weeks after surgery.    Chief Complaint   Patient presents with    Nephrolithiasis     Patient is here today for left flank pain x 1 week.  Patient went to the ER.        Physical Exam     TODAYS LAB RESULTS:  === 12/20/23 ===    CT ABDOMEN PELVIS WO IV CONTRAST    - Impression -  1. 0.3 cm calculus within the left mid ureter contributing to very  mild obstruction with mild left perinephric fat stranding and slight  prominence of the left proximal ureter. No hydronephrosis. No  additional urinary calculi.    Signed by: Roman Rehman 12/21/2023 1:17 AM  Dictation workstation:   [id number]     Component  Ref Range & Units 13:17   POC Glucose, Urine  NEGATIVE mg/dl NEGATIVE   POC Bilirubin, Urine  NEGATIVE NEGATIVE   POC Ketones, Urine  NEGATIVE mg/dl NEGATIVE   POC Specific Gravity, Urine  1.005 - 1.035 1.015   POC Blood, Urine  NEGATIVE TRACE-Intact Abnormal    POC PH, Urine  No Reference Range Established PH 6.0   POC Protein, Urine  NEGATIVE, 30 (1+) mg/dl NEGATIVE   POC Urobilinogen, Urine  0.2, 1.0 EU/DL 0.2   Poc Nitrite, Urine  NEGATIVE NEGATIVE   POC Leukocytes, Urine  NEGATIVE NEGATIVE         ASSESSMENT&PLAN:      IMPRESSIONS:      06/03/2021  40-year-old gentleman developed acute onset of right flank pain, he has had visited the  ER twice for small right distal ureteral stone. Currently he has 4/10 pain.     Patient has no nausea, no vomiting, no fever.     IO UA (automated w/o microscopy)           03Jun2021 09:37AM          Benitez Grimes     Test Name       Result     Flag        Reference  IO Glucose - Urine         Negative                  IO Bilirubin       Negative                  IO Ketones      Negative                  IO Specific Gravity         1.030                       IO Blood          Negative                  IO pH               6.5                           IO Protein, Urine            Negative                  IO Urobilinogen              1 mg/dl                    IO Nitrite, Urine              Negative                  IO Leukocytes Negative                  IO Glucose - Urine         Negative                  IO Bilirubin       Negative                  IO Ketones      Negative                  IO Specific Gravity         1.030                       IO Blood          Negative                  IO pH               6.5                           IO Protein, Urine            Negative                  IO Urobilinogen              1 mg/dl                    IO Nitrite, Urine              Negative                  IO Leukocytes Negative                                                            CT abdomen and pelvis without contrast  IMPRESSION:  1. 2.5 mm stone distal right ureter without hydronephrosis.     CT scan abdomen and pelvis resolved demonstrates     I talked to patient about options for the kidney stones, #1 watchful waiting, #2 surgical intervention, cystoscopy ureteroscopy holmium laser lithotripsy and stent insertion. All questions were answered, patient expressed understanding and agreed to the plan.     Impression  Right flank pain  Right ureteral stone     Plan:  Cystoscopy ureteroscopy holmium laser lithotripsy and right stent insertion;  Return to office 2 weeks after surgery.        Chief Complaint     New  Patient here for ER follow up from 5/26/21 and 5/29/21. CT done 5/26/21: IMPRESSION:  1. 2.5 mm stone distal right ureter without hydronephrosis.      History of Present Illness  On a scale of 0 to 10, the patient rates the pain at 8.   Pain Location: left flank.   Pain Quality: Stabbing.         06/03/2021  40-year-old gentleman developed acute onset of right flank pain, he has had visited the ER twice for small right distal ureteral stone. Currently he has 4/10 pain.     Patient has no nausea, no vomiting, no fever.     IO UA (automated w/o microscopy)           03Jun2021 09:37AM          Benitez Grimes     Test Name       Result     Flag        Reference  IO Glucose - Urine         Negative                  IO Bilirubin       Negative                  IO Ketones      Negative                  IO Specific Gravity         1.030                       IO Blood          Negative                  IO pH               6.5                           IO Protein, Urine            Negative                  IO Urobilinogen              1 mg/dl                    IO Nitrite, Urine              Negative                  IO Leukocytes Negative                  IO Glucose - Urine         Negative                  IO Bilirubin       Negative                  IO Ketones      Negative                  IO Specific Gravity         1.030                       IO Blood          Negative                  IO pH               6.5                           IO Protein, Urine            Negative                  IO Urobilinogen              1 mg/dl                    IO Nitrite, Urine              Negative                  IO Leukocytes Negative                                                            CT abdomen and pelvis without contrast  IMPRESSION:  1. 2.5 mm stone distal right ureter without hydronephrosis.     CT scan abdomen and pelvis resolved demonstrates     I talked to patient about options for the kidney stones, #1 watchful waiting,  #2 surgical intervention, cystoscopy ureteroscopy holmium laser lithotripsy and stent insertion. All questions were answered, patient expressed understanding and agreed to the plan.     Impression  Right flank pain  Right ureteral stone     Plan:  Cystoscopy ureteroscopy holmium laser lithotripsy and right stent insertion;  Return to office 2 weeks after surgery.

## 2023-12-28 NOTE — PROGRESS NOTES
12/28/2023  Intermittent left flank pain for a week    CT scan demonstrated a 3 mm left mid ureteral stone      I talked to patient about options for the kidney stones, #1 watchful waiting, #2 surgical intervention, cystoscopy ureteroscopy holmium laser lithotripsy and stent insertion. All questions were answered, patient expressed understanding and agreed to the plan.    Impression  Left flank pain  Left ureteral stone    Plan:  Cystoscopy ureteroscopy holmium laser lithotripsy and left stent insertion;  Cipro 500 mg twice a day x7 days;  Norco 5/325 one to 2 tablets every 4-6 hours x30;  Flomax 0.4 mg daily at bedtime x7;(patient has medication at home)  Return to office 2 weeks after surgery.    Chief Complaint   Patient presents with    Nephrolithiasis     Patient is here today for left flank pain x 1 week.  Patient went to the ER.        Physical Exam     TODAYS LAB RESULTS:  === 12/20/23 ===    CT ABDOMEN PELVIS WO IV CONTRAST    - Impression -  1. 0.3 cm calculus within the left mid ureter contributing to very  mild obstruction with mild left perinephric fat stranding and slight  prominence of the left proximal ureter. No hydronephrosis. No  additional urinary calculi.    Signed by: Roman Rehman 12/21/2023 1:17 AM  Dictation workstation:   [id number]     Component  Ref Range & Units 13:17   POC Glucose, Urine  NEGATIVE mg/dl NEGATIVE   POC Bilirubin, Urine  NEGATIVE NEGATIVE   POC Ketones, Urine  NEGATIVE mg/dl NEGATIVE   POC Specific Gravity, Urine  1.005 - 1.035 1.015   POC Blood, Urine  NEGATIVE TRACE-Intact Abnormal    POC PH, Urine  No Reference Range Established PH 6.0   POC Protein, Urine  NEGATIVE, 30 (1+) mg/dl NEGATIVE   POC Urobilinogen, Urine  0.2, 1.0 EU/DL 0.2   Poc Nitrite, Urine  NEGATIVE NEGATIVE   POC Leukocytes, Urine  NEGATIVE NEGATIVE         ASSESSMENT&PLAN:      IMPRESSIONS:      06/03/2021  40-year-old gentleman developed acute onset of right flank pain, he has had visited the  ER twice for small right distal ureteral stone. Currently he has 4/10 pain.     Patient has no nausea, no vomiting, no fever.     IO UA (automated w/o microscopy)           03Jun2021 09:37AM          Benitez Grimes     Test Name       Result     Flag        Reference  IO Glucose - Urine         Negative                  IO Bilirubin       Negative                  IO Ketones      Negative                  IO Specific Gravity         1.030                       IO Blood          Negative                  IO pH               6.5                           IO Protein, Urine            Negative                  IO Urobilinogen              1 mg/dl                    IO Nitrite, Urine              Negative                  IO Leukocytes Negative                  IO Glucose - Urine         Negative                  IO Bilirubin       Negative                  IO Ketones      Negative                  IO Specific Gravity         1.030                       IO Blood          Negative                  IO pH               6.5                           IO Protein, Urine            Negative                  IO Urobilinogen              1 mg/dl                    IO Nitrite, Urine              Negative                  IO Leukocytes Negative                                                            CT abdomen and pelvis without contrast  IMPRESSION:  1. 2.5 mm stone distal right ureter without hydronephrosis.     CT scan abdomen and pelvis resolved demonstrates     I talked to patient about options for the kidney stones, #1 watchful waiting, #2 surgical intervention, cystoscopy ureteroscopy holmium laser lithotripsy and stent insertion. All questions were answered, patient expressed understanding and agreed to the plan.     Impression  Right flank pain  Right ureteral stone     Plan:  Cystoscopy ureteroscopy holmium laser lithotripsy and right stent insertion;  Return to office 2 weeks after surgery.        Chief Complaint     New  Patient here for ER follow up from 5/26/21 and 5/29/21. CT done 5/26/21: IMPRESSION:  1. 2.5 mm stone distal right ureter without hydronephrosis.      History of Present Illness  On a scale of 0 to 10, the patient rates the pain at 8.   Pain Location: left flank.   Pain Quality: Stabbing.         06/03/2021  40-year-old gentleman developed acute onset of right flank pain, he has had visited the ER twice for small right distal ureteral stone. Currently he has 4/10 pain.     Patient has no nausea, no vomiting, no fever.     IO UA (automated w/o microscopy)           03Jun2021 09:37AM          Benitez Grimes     Test Name       Result     Flag        Reference  IO Glucose - Urine         Negative                  IO Bilirubin       Negative                  IO Ketones      Negative                  IO Specific Gravity         1.030                       IO Blood          Negative                  IO pH               6.5                           IO Protein, Urine            Negative                  IO Urobilinogen              1 mg/dl                    IO Nitrite, Urine              Negative                  IO Leukocytes Negative                  IO Glucose - Urine         Negative                  IO Bilirubin       Negative                  IO Ketones      Negative                  IO Specific Gravity         1.030                       IO Blood          Negative                  IO pH               6.5                           IO Protein, Urine            Negative                  IO Urobilinogen              1 mg/dl                    IO Nitrite, Urine              Negative                  IO Leukocytes Negative                                                            CT abdomen and pelvis without contrast  IMPRESSION:  1. 2.5 mm stone distal right ureter without hydronephrosis.     CT scan abdomen and pelvis resolved demonstrates     I talked to patient about options for the kidney stones, #1 watchful waiting,  #2 surgical intervention, cystoscopy ureteroscopy holmium laser lithotripsy and stent insertion. All questions were answered, patient expressed understanding and agreed to the plan.     Impression  Right flank pain  Right ureteral stone     Plan:  Cystoscopy ureteroscopy holmium laser lithotripsy and right stent insertion;  Return to office 2 weeks after surgery.

## 2023-12-29 ENCOUNTER — ANESTHESIA EVENT (OUTPATIENT)
Dept: OPERATING ROOM | Facility: HOSPITAL | Age: 43
End: 2023-12-29
Payer: COMMERCIAL

## 2023-12-29 ENCOUNTER — TELEPHONE (OUTPATIENT)
Dept: UROLOGY | Facility: CLINIC | Age: 43
End: 2023-12-29
Payer: COMMERCIAL

## 2023-12-29 NOTE — TELEPHONE ENCOUNTER
Pt's wife is calling in asking if they can double up on the toradol 10 mg .. He does take medication for high blood pressure as well. He is just in a grave amount of pain and they just wanted to know if they can give him double the dose.

## 2024-01-02 ENCOUNTER — HOSPITAL ENCOUNTER (OUTPATIENT)
Facility: HOSPITAL | Age: 44
Setting detail: OUTPATIENT SURGERY
Discharge: HOME | End: 2024-01-02
Attending: UROLOGY | Admitting: UROLOGY
Payer: COMMERCIAL

## 2024-01-02 ENCOUNTER — HOSPITAL ENCOUNTER (OUTPATIENT)
Dept: CARDIOLOGY | Facility: HOSPITAL | Age: 44
Discharge: HOME | End: 2024-01-02
Payer: COMMERCIAL

## 2024-01-02 ENCOUNTER — PHARMACY VISIT (OUTPATIENT)
Dept: PHARMACY | Facility: CLINIC | Age: 44
End: 2024-01-02
Payer: MEDICARE

## 2024-01-02 ENCOUNTER — ANESTHESIA (OUTPATIENT)
Dept: OPERATING ROOM | Facility: HOSPITAL | Age: 44
End: 2024-01-02
Payer: COMMERCIAL

## 2024-01-02 ENCOUNTER — APPOINTMENT (OUTPATIENT)
Dept: RADIOLOGY | Facility: HOSPITAL | Age: 44
End: 2024-01-02
Payer: COMMERCIAL

## 2024-01-02 VITALS
TEMPERATURE: 97.2 F | OXYGEN SATURATION: 97 % | RESPIRATION RATE: 16 BRPM | HEIGHT: 65 IN | HEART RATE: 62 BPM | DIASTOLIC BLOOD PRESSURE: 91 MMHG | BODY MASS INDEX: 36.99 KG/M2 | SYSTOLIC BLOOD PRESSURE: 140 MMHG | WEIGHT: 222 LBS

## 2024-01-02 DIAGNOSIS — N20.1 URETERAL STONE: ICD-10-CM

## 2024-01-02 DIAGNOSIS — N20.2 CALCULUS OF KIDNEY AND URETER: ICD-10-CM

## 2024-01-02 DIAGNOSIS — N20.1 URETERAL STONE: Primary | ICD-10-CM

## 2024-01-02 PROCEDURE — 93010 ELECTROCARDIOGRAM REPORT: CPT | Performed by: INTERNAL MEDICINE

## 2024-01-02 PROCEDURE — 2500000004 HC RX 250 GENERAL PHARMACY W/ HCPCS (ALT 636 FOR OP/ED): Performed by: NURSE ANESTHETIST, CERTIFIED REGISTERED

## 2024-01-02 PROCEDURE — 52353 CYSTOURETERO W/LITHOTRIPSY: CPT | Performed by: UROLOGY

## 2024-01-02 PROCEDURE — 7100000001 HC RECOVERY ROOM TIME - INITIAL BASE CHARGE: Performed by: UROLOGY

## 2024-01-02 PROCEDURE — 7100000002 HC RECOVERY ROOM TIME - EACH INCREMENTAL 1 MINUTE: Performed by: UROLOGY

## 2024-01-02 PROCEDURE — 2550000001 HC RX 255 CONTRASTS: Performed by: UROLOGY

## 2024-01-02 PROCEDURE — 2720000007 HC OR 272 NO HCPCS: Performed by: UROLOGY

## 2024-01-02 PROCEDURE — 2500000001 HC RX 250 WO HCPCS SELF ADMINISTERED DRUGS (ALT 637 FOR MEDICARE OP): Performed by: ANESTHESIOLOGY

## 2024-01-02 PROCEDURE — 3700000002 HC GENERAL ANESTHESIA TIME - EACH INCREMENTAL 1 MINUTE: Performed by: UROLOGY

## 2024-01-02 PROCEDURE — RXMED WILLOW AMBULATORY MEDICATION CHARGE

## 2024-01-02 PROCEDURE — 76000 FLUOROSCOPY <1 HR PHYS/QHP: CPT

## 2024-01-02 PROCEDURE — 3600000003 HC OR TIME - INITIAL BASE CHARGE - PROCEDURE LEVEL THREE: Performed by: UROLOGY

## 2024-01-02 PROCEDURE — C1769 GUIDE WIRE: HCPCS | Performed by: UROLOGY

## 2024-01-02 PROCEDURE — 3600000008 HC OR TIME - EACH INCREMENTAL 1 MINUTE - PROCEDURE LEVEL THREE: Performed by: UROLOGY

## 2024-01-02 PROCEDURE — 3700000001 HC GENERAL ANESTHESIA TIME - INITIAL BASE CHARGE: Performed by: UROLOGY

## 2024-01-02 PROCEDURE — 2500000004 HC RX 250 GENERAL PHARMACY W/ HCPCS (ALT 636 FOR OP/ED): Performed by: UROLOGY

## 2024-01-02 PROCEDURE — 7100000009 HC PHASE TWO TIME - INITIAL BASE CHARGE: Performed by: UROLOGY

## 2024-01-02 PROCEDURE — 93005 ELECTROCARDIOGRAM TRACING: CPT

## 2024-01-02 PROCEDURE — 7100000010 HC PHASE TWO TIME - EACH INCREMENTAL 1 MINUTE: Performed by: UROLOGY

## 2024-01-02 PROCEDURE — 74420 UROGRAPHY RTRGR +-KUB: CPT | Performed by: UROLOGY

## 2024-01-02 PROCEDURE — 2500000005 HC RX 250 GENERAL PHARMACY W/O HCPCS: Performed by: NURSE ANESTHETIST, CERTIFIED REGISTERED

## 2024-01-02 PROCEDURE — 2500000004 HC RX 250 GENERAL PHARMACY W/ HCPCS (ALT 636 FOR OP/ED): Performed by: ANESTHESIOLOGY

## 2024-01-02 RX ORDER — ONDANSETRON HYDROCHLORIDE 2 MG/ML
4 INJECTION, SOLUTION INTRAVENOUS ONCE
Status: COMPLETED | OUTPATIENT
Start: 2024-01-02 | End: 2024-01-02

## 2024-01-02 RX ORDER — KETOROLAC TROMETHAMINE 30 MG/ML
INJECTION, SOLUTION INTRAMUSCULAR; INTRAVENOUS AS NEEDED
Status: DISCONTINUED | OUTPATIENT
Start: 2024-01-02 | End: 2024-01-02

## 2024-01-02 RX ORDER — CEFAZOLIN SODIUM 2 G/100ML
2 INJECTION, SOLUTION INTRAVENOUS ONCE
Status: COMPLETED | OUTPATIENT
Start: 2024-01-02 | End: 2024-01-02

## 2024-01-02 RX ORDER — HYDROCODONE BITARTRATE AND ACETAMINOPHEN 5; 325 MG/1; MG/1
1 TABLET ORAL EVERY 6 HOURS PRN
Qty: 20 TABLET | Refills: 0 | Status: SHIPPED | OUTPATIENT
Start: 2024-01-02

## 2024-01-02 RX ORDER — SODIUM CHLORIDE, SODIUM LACTATE, POTASSIUM CHLORIDE, CALCIUM CHLORIDE 600; 310; 30; 20 MG/100ML; MG/100ML; MG/100ML; MG/100ML
50 INJECTION, SOLUTION INTRAVENOUS CONTINUOUS
Status: DISCONTINUED | OUTPATIENT
Start: 2024-01-02 | End: 2024-01-02 | Stop reason: HOSPADM

## 2024-01-02 RX ORDER — LIDOCAINE HYDROCHLORIDE 20 MG/ML
INJECTION, SOLUTION INFILTRATION; PERINEURAL AS NEEDED
Status: DISCONTINUED | OUTPATIENT
Start: 2024-01-02 | End: 2024-01-02

## 2024-01-02 RX ORDER — FENTANYL CITRATE 50 UG/ML
INJECTION, SOLUTION INTRAMUSCULAR; INTRAVENOUS AS NEEDED
Status: DISCONTINUED | OUTPATIENT
Start: 2024-01-02 | End: 2024-01-02

## 2024-01-02 RX ORDER — SODIUM CITRATE AND CITRIC ACID MONOHYDRATE 334; 500 MG/5ML; MG/5ML
30 SOLUTION ORAL ONCE
Status: COMPLETED | OUTPATIENT
Start: 2024-01-02 | End: 2024-01-02

## 2024-01-02 RX ORDER — DEXAMETHASONE SODIUM PHOSPHATE 4 MG/ML
INJECTION, SOLUTION INTRA-ARTICULAR; INTRALESIONAL; INTRAMUSCULAR; INTRAVENOUS; SOFT TISSUE AS NEEDED
Status: DISCONTINUED | OUTPATIENT
Start: 2024-01-02 | End: 2024-01-02

## 2024-01-02 RX ORDER — PROPOFOL 10 MG/ML
INJECTION, EMULSION INTRAVENOUS AS NEEDED
Status: DISCONTINUED | OUTPATIENT
Start: 2024-01-02 | End: 2024-01-02

## 2024-01-02 RX ORDER — MIDAZOLAM HYDROCHLORIDE 1 MG/ML
INJECTION, SOLUTION INTRAMUSCULAR; INTRAVENOUS AS NEEDED
Status: DISCONTINUED | OUTPATIENT
Start: 2024-01-02 | End: 2024-01-02

## 2024-01-02 RX ORDER — METOCLOPRAMIDE HYDROCHLORIDE 5 MG/ML
10 INJECTION INTRAMUSCULAR; INTRAVENOUS ONCE
Status: COMPLETED | OUTPATIENT
Start: 2024-01-02 | End: 2024-01-02

## 2024-01-02 RX ORDER — ONDANSETRON HYDROCHLORIDE 2 MG/ML
4 INJECTION, SOLUTION INTRAVENOUS ONCE AS NEEDED
Status: DISCONTINUED | OUTPATIENT
Start: 2024-01-02 | End: 2024-01-02 | Stop reason: HOSPADM

## 2024-01-02 RX ORDER — MORPHINE SULFATE 4 MG/ML
4 INJECTION INTRAVENOUS EVERY 5 MIN PRN
Status: DISCONTINUED | OUTPATIENT
Start: 2024-01-02 | End: 2024-01-02 | Stop reason: HOSPADM

## 2024-01-02 RX ORDER — MORPHINE SULFATE 2 MG/ML
2 INJECTION, SOLUTION INTRAMUSCULAR; INTRAVENOUS EVERY 5 MIN PRN
Status: DISCONTINUED | OUTPATIENT
Start: 2024-01-02 | End: 2024-01-02 | Stop reason: HOSPADM

## 2024-01-02 RX ORDER — FAMOTIDINE 10 MG/ML
20 INJECTION INTRAVENOUS ONCE
Status: COMPLETED | OUTPATIENT
Start: 2024-01-02 | End: 2024-01-02

## 2024-01-02 RX ADMIN — CEFAZOLIN SODIUM 2 G: 2 INJECTION, SOLUTION INTRAVENOUS at 09:02

## 2024-01-02 RX ADMIN — SODIUM CITRATE AND CITRIC ACID MONOHYDRATE 30 ML: 500; 334 SOLUTION ORAL at 08:37

## 2024-01-02 RX ADMIN — FENTANYL CITRATE 100 MCG: 50 INJECTION, SOLUTION INTRAMUSCULAR; INTRAVENOUS at 09:14

## 2024-01-02 RX ADMIN — FENTANYL CITRATE 100 MCG: 50 INJECTION, SOLUTION INTRAMUSCULAR; INTRAVENOUS at 09:24

## 2024-01-02 RX ADMIN — METOCLOPRAMIDE 10 MG: 5 INJECTION, SOLUTION INTRAMUSCULAR; INTRAVENOUS at 08:37

## 2024-01-02 RX ADMIN — FAMOTIDINE 20 MG: 10 INJECTION, SOLUTION INTRAVENOUS at 08:37

## 2024-01-02 RX ADMIN — MIDAZOLAM HYDROCHLORIDE 2 MG: 1 INJECTION, SOLUTION INTRAMUSCULAR; INTRAVENOUS at 09:14

## 2024-01-02 RX ADMIN — ONDANSETRON 4 MG: 2 INJECTION INTRAMUSCULAR; INTRAVENOUS at 08:37

## 2024-01-02 RX ADMIN — PROPOFOL 200 MG: 10 INJECTION, EMULSION INTRAVENOUS at 09:14

## 2024-01-02 RX ADMIN — SODIUM CHLORIDE, POTASSIUM CHLORIDE, SODIUM LACTATE AND CALCIUM CHLORIDE 50 ML/HR: 600; 310; 30; 20 INJECTION, SOLUTION INTRAVENOUS at 08:37

## 2024-01-02 RX ADMIN — LIDOCAINE HYDROCHLORIDE 70 MG: 20 INJECTION, SOLUTION INFILTRATION; PERINEURAL at 09:14

## 2024-01-02 RX ADMIN — DEXAMETHASONE SODIUM PHOSPHATE 8 MG: 4 INJECTION INTRA-ARTICULAR; INTRALESIONAL; INTRAMUSCULAR; INTRAVENOUS; SOFT TISSUE at 09:20

## 2024-01-02 RX ADMIN — KETOROLAC TROMETHAMINE 30 MG: 30 INJECTION, SOLUTION INTRAMUSCULAR; INTRAVENOUS at 09:36

## 2024-01-02 SDOH — HEALTH STABILITY: MENTAL HEALTH: CURRENT SMOKER: 0

## 2024-01-02 ASSESSMENT — COLUMBIA-SUICIDE SEVERITY RATING SCALE - C-SSRS
6. HAVE YOU EVER DONE ANYTHING, STARTED TO DO ANYTHING, OR PREPARED TO DO ANYTHING TO END YOUR LIFE?: NO
2. HAVE YOU ACTUALLY HAD ANY THOUGHTS OF KILLING YOURSELF?: NO
1. IN THE PAST MONTH, HAVE YOU WISHED YOU WERE DEAD OR WISHED YOU COULD GO TO SLEEP AND NOT WAKE UP?: NO

## 2024-01-02 ASSESSMENT — PAIN SCALES - GENERAL
PAINLEVEL_OUTOF10: 0 - NO PAIN
PAIN_LEVEL: 2
PAINLEVEL_OUTOF10: 1
PAINLEVEL_OUTOF10: 0 - NO PAIN

## 2024-01-02 ASSESSMENT — PAIN - FUNCTIONAL ASSESSMENT: PAIN_FUNCTIONAL_ASSESSMENT: 0-10

## 2024-01-02 NOTE — ANESTHESIA PREPROCEDURE EVALUATION
Patient: Maicol Haley    Procedure Information       Date/Time: 01/02/24 8585    Procedures:       CYSTOSCOPY PYELOGRAM LEFT URETEROSCOPY HOLMIUM LASER LITHOTRIPSY LEFT STENT INSERTION *C ARM* (Left) - CYSTOSCOPY PYELOGRAM LEFT URETEROSCOPY HOLMIUM LASER LITHOTRIPSY LEFT STENT INSERTION    ANNA MARIE  60 MIN      . (Left)    Location: [facility] / [facility]    Surgeons: Benitez Grimes MD            Relevant Problems   Cardiovascular   (+) Benign essential hypertension      Endocrine   (+) Class 2 severe obesity due to excess calories with serious comorbidity and body mass index (BMI) of 37.0 to 37.9 in adult (CMS/HCC)      /Renal   (+) Kidney stones      Neuro/Psych   (+) Low back pain with sciatica      Infectious Disease   (+) Fungal dermatitis      Other   (+) Arthritis of right hip   (+) Facet arthritis of lumbosacral region       Clinical information reviewed:      Problems  Med Hx             NPO Detail:  No data recorded     Physical Exam    Airway  Mallampati: III  TM distance: >3 FB  Neck ROM: full     Cardiovascular - normal exam     Dental - normal exam     Pulmonary - normal exam     Abdominal            Anesthesia Plan    ASA 2     general     The patient is not a current smoker.    intravenous induction   Anesthetic plan and risks discussed with patient.  Use of blood products discussed with patient who.    Plan discussed with CRNA.

## 2024-01-02 NOTE — ANESTHESIA POSTPROCEDURE EVALUATION
Patient: Maicol Haley    Procedure Summary       Date: 01/02/24 Room / Location: [facility]    Anesthesia Start: 0902 Anesthesia Stop: 0951    Procedure: CYSTOSCOPY PYELOGRAM LEFT FLEXIBLE URETEROSCOPY *C ARM* (Left) Diagnosis:       Ureteral stone      (Ureteral stone [N20.1])    Surgeons: Benitez Grimes MD Responsible Provider: DIDIER Spencer    Anesthesia Type: general ASA Status: 2            Anesthesia Type: general    Vitals Value Taken Time   /90 01/02/24 1044   Temp 36.2 °C (97.2 °F) 01/02/24 0947   Pulse 64 01/02/24 1042   Resp 16 01/02/24 1042   SpO2 95 % 01/02/24 1036   Vitals shown include unvalidated device data.    Anesthesia Post Evaluation    Patient location during evaluation: PACU  Patient participation: complete - patient participated  Level of consciousness: awake and alert  Pain score: 2  Pain management: adequate  Airway patency: patent  Cardiovascular status: acceptable  Respiratory status: acceptable  Hydration status: acceptable  Postoperative Nausea and Vomiting: none    No notable events documented.

## 2024-01-02 NOTE — OP NOTE
2023 addended  Patient developed severe left flank pain, CT scan demonstrated mild hydronephrosis on the left without stone.  Plan cystoscopy left stent insertion today  Discussed with wife and patient, indication of surgery risk-benefit and alternative.  They expressed understanding and agreed to proceed      CYSTOSCOPY PYELOGRAM LEFT URETEROSCOPY  *C ARM* (L), . (L) Operative Note     Date: 2024  OR Location: [facility]    Name: Maicol Haley, : 1980, Age: 43 y.o., MRN: [id number], Sex: male    Diagnosis  Pre-op Diagnosis     * Ureteral stone [N20.1] Post-op Diagnosis     * Ureteral stone [N20.1]     Procedures  Cystoscopy, retrograde pyelogram, left ureteroscopy, flexible ureteroscopy    Surgeons      * Benitez Grimes - Primary    Resident/Fellow/Other Assistant:  Surgeon(s) and Role:    Procedure Summary  Anesthesia: General  ASA: II  Anesthesia Staff: CRNA: BHASKAR Spencer-CRNA  Estimated Blood Loss: 0mL  Intra-op Medications: * No intraprocedure medications in log *           Anesthesia Record               Intraprocedure I/O Totals       None           Specimen: No specimens collected     Staff:   Circulator: Imelda Keenan RN; Gray Flores RN  Relief Circulator: Tisha Laws RN  Scrub Person: Ronna Beckhamna         Drains and/or Catheters: * None in log *    Tourniquet Times:         Implants:     Findings: Passed left ureteral stone    Indications: Maicol Haley is an 43 y.o. male who is having surgery for Ureteral stone [N20.1].  Left ureteral stone    The patient was seen in the preoperative area. The risks, benefits, complications, treatment options, non-operative alternatives, expected recovery and outcomes were discussed with the patient. The possibilities of reaction to medication, pulmonary aspiration, injury to surrounding structures, bleeding, recurrent infection, the need for additional procedures, failure to diagnose a condition, and creating a  complication requiring transfusion or operation were discussed with the patient. The patient concurred with the proposed plan, giving informed consent.  The site of surgery was properly noted/marked if necessary per policy. The patient has been actively warmed in preoperative area. Preoperative antibiotics have been ordered and given within 1 hours of incision. Venous thrombosis prophylaxis have been ordered including bilateral sequential compression devices    Procedure Details: Patient was identified in the preoperative holding area and brought into the room, placed on supine position. After a general anesthesia was induced, patient was repositioned in a dorsal lithotomy position, genitalia area was prepped and draped in a routine standard fashion. A cystoscopy was performed with a 22F Olympus cystoscope, and the findings include normal urethra, normal bladder, no tumor no stone. A retrograde pyelogram was performed on the left side, findings include no filling defects inleft ureter, no dilatation of ureter therefore a 035 Glidewire was inserted followed by an insertion of semirigid ureteroscope, later switched to the flexible ureteroscope entire ureter and renal pelvis and calyces were evaluated and no stone no tumor.ureteroscope was removed and bladder was emptied.  Patient extubated and returned to PACU in a stable condition.  Complications:  None; patient tolerated the procedure well.    Disposition: PACU - hemodynamically stable.  Condition: stable         Plan  Norco x 20  Follow-up as needed    Attending Attestation:     Benitez Grimes  Phone Number: [phone]

## 2024-01-02 NOTE — ANESTHESIA PROCEDURE NOTES
Airway  Date/Time: 1/2/2024 9:14 AM  Urgency: elective    Airway not difficult    Staffing  Performed: CRNA   Authorized by: DIDIER Spencer    Performed by: DIDIER Spencer  Patient location during procedure: OR    Indications and Patient Condition  Indications for airway management: anesthesia  Spontaneous ventilation: present  Sedation level: deep  Preoxygenated: yes  Patient position: sniffing  Mask difficulty assessment: 1 - vent by mask    Final Airway Details  Final airway type: supraglottic airway      Successful airway: classic  Size 5

## 2024-01-04 ENCOUNTER — TELEPHONE (OUTPATIENT)
Dept: UROLOGY | Facility: CLINIC | Age: 44
End: 2024-01-04
Payer: COMMERCIAL

## 2024-01-04 ENCOUNTER — HOSPITAL ENCOUNTER (OUTPATIENT)
Dept: RADIOLOGY | Facility: HOSPITAL | Age: 44
Discharge: HOME | End: 2024-01-04
Payer: COMMERCIAL

## 2024-01-04 DIAGNOSIS — N20.1 URETERAL STONE: ICD-10-CM

## 2024-01-04 DIAGNOSIS — N20.1 URETERAL STONE: Primary | ICD-10-CM

## 2024-01-04 LAB
ATRIAL RATE: 77 BPM
P AXIS: 30 DEGREES
PR INTERVAL: 172 MS
Q ONSET: 249 MS
QRS COUNT: 13 BEATS
QRS DURATION: 86 MS
QT INTERVAL: 380 MS
QTC CALCULATION(BAZETT): 431 MS
QTC FREDERICIA: 412 MS
R AXIS: -13 DEGREES
T AXIS: 25 DEGREES
T OFFSET: 439 MS
VENTRICULAR RATE: 77 BPM

## 2024-01-04 PROCEDURE — 74176 CT ABD & PELVIS W/O CONTRAST: CPT

## 2024-01-04 PROCEDURE — 74176 CT ABD & PELVIS W/O CONTRAST: CPT | Performed by: STUDENT IN AN ORGANIZED HEALTH CARE EDUCATION/TRAINING PROGRAM

## 2024-01-04 NOTE — TELEPHONE ENCOUNTER
Patient had stone surgery on Tuesday 1/2, wife said they spoke to you last night regarding his pain, feels like another stone, has a slight temp.  They called this morning, he is still in pain and wanting to know what they should do or if this is normal.  Was sick to his stomach also  Thank you

## 2024-01-05 ENCOUNTER — HOSPITAL ENCOUNTER (OUTPATIENT)
Facility: HOSPITAL | Age: 44
Setting detail: OUTPATIENT SURGERY
Discharge: HOME | End: 2024-01-05
Attending: UROLOGY | Admitting: UROLOGY
Payer: COMMERCIAL

## 2024-01-05 ENCOUNTER — PHARMACY VISIT (OUTPATIENT)
Dept: PHARMACY | Facility: CLINIC | Age: 44
End: 2024-01-05
Payer: MEDICARE

## 2024-01-05 ENCOUNTER — APPOINTMENT (OUTPATIENT)
Dept: RADIOLOGY | Facility: HOSPITAL | Age: 44
End: 2024-01-05
Payer: COMMERCIAL

## 2024-01-05 ENCOUNTER — ANESTHESIA EVENT (OUTPATIENT)
Dept: OPERATING ROOM | Facility: HOSPITAL | Age: 44
End: 2024-01-05
Payer: COMMERCIAL

## 2024-01-05 ENCOUNTER — ANESTHESIA (OUTPATIENT)
Dept: OPERATING ROOM | Facility: HOSPITAL | Age: 44
End: 2024-01-05
Payer: COMMERCIAL

## 2024-01-05 VITALS
SYSTOLIC BLOOD PRESSURE: 128 MMHG | HEART RATE: 56 BPM | TEMPERATURE: 98.1 F | WEIGHT: 222 LBS | DIASTOLIC BLOOD PRESSURE: 80 MMHG | BODY MASS INDEX: 36.99 KG/M2 | HEIGHT: 65 IN | OXYGEN SATURATION: 100 % | RESPIRATION RATE: 15 BRPM

## 2024-01-05 DIAGNOSIS — N20.1 URETERAL STONE: Primary | ICD-10-CM

## 2024-01-05 PROCEDURE — RXMED WILLOW AMBULATORY MEDICATION CHARGE

## 2024-01-05 PROCEDURE — 7100000001 HC RECOVERY ROOM TIME - INITIAL BASE CHARGE: Performed by: UROLOGY

## 2024-01-05 PROCEDURE — C1769 GUIDE WIRE: HCPCS | Performed by: UROLOGY

## 2024-01-05 PROCEDURE — 2500000004 HC RX 250 GENERAL PHARMACY W/ HCPCS (ALT 636 FOR OP/ED): Performed by: NURSE ANESTHETIST, CERTIFIED REGISTERED

## 2024-01-05 PROCEDURE — 3700000002 HC GENERAL ANESTHESIA TIME - EACH INCREMENTAL 1 MINUTE: Performed by: UROLOGY

## 2024-01-05 PROCEDURE — 2781000 HC OR 278 NO HCPCS: Performed by: UROLOGY

## 2024-01-05 PROCEDURE — 7100000002 HC RECOVERY ROOM TIME - EACH INCREMENTAL 1 MINUTE: Performed by: UROLOGY

## 2024-01-05 PROCEDURE — 2500000005 HC RX 250 GENERAL PHARMACY W/O HCPCS: Performed by: NURSE ANESTHETIST, CERTIFIED REGISTERED

## 2024-01-05 PROCEDURE — 76000 FLUOROSCOPY <1 HR PHYS/QHP: CPT

## 2024-01-05 PROCEDURE — 3600000008 HC OR TIME - EACH INCREMENTAL 1 MINUTE - PROCEDURE LEVEL THREE: Performed by: UROLOGY

## 2024-01-05 PROCEDURE — 2500000004 HC RX 250 GENERAL PHARMACY W/ HCPCS (ALT 636 FOR OP/ED): Performed by: ANESTHESIOLOGY

## 2024-01-05 PROCEDURE — 52352 CYSTOURETERO W/STONE REMOVE: CPT | Performed by: UROLOGY

## 2024-01-05 PROCEDURE — 74420 UROGRAPHY RTRGR +-KUB: CPT | Performed by: UROLOGY

## 2024-01-05 PROCEDURE — 7100000009 HC PHASE TWO TIME - INITIAL BASE CHARGE: Performed by: UROLOGY

## 2024-01-05 PROCEDURE — 2500000004 HC RX 250 GENERAL PHARMACY W/ HCPCS (ALT 636 FOR OP/ED): Performed by: UROLOGY

## 2024-01-05 PROCEDURE — 2720000007 HC OR 272 NO HCPCS: Performed by: UROLOGY

## 2024-01-05 PROCEDURE — 2500000001 HC RX 250 WO HCPCS SELF ADMINISTERED DRUGS (ALT 637 FOR MEDICARE OP): Performed by: UROLOGY

## 2024-01-05 PROCEDURE — 3700000001 HC GENERAL ANESTHESIA TIME - INITIAL BASE CHARGE: Performed by: UROLOGY

## 2024-01-05 PROCEDURE — 7100000010 HC PHASE TWO TIME - EACH INCREMENTAL 1 MINUTE: Performed by: UROLOGY

## 2024-01-05 PROCEDURE — 2550000001 HC RX 255 CONTRASTS: Performed by: UROLOGY

## 2024-01-05 PROCEDURE — 3600000003 HC OR TIME - INITIAL BASE CHARGE - PROCEDURE LEVEL THREE: Performed by: UROLOGY

## 2024-01-05 DEVICE — INLAY URETERAL STENT W/HYDROGLIDE GUIDEWIRE
Type: IMPLANTABLE DEVICE | Site: KIDNEY | Status: FUNCTIONAL
Brand: BARD® INLAY® URETERAL STENT WITH HYDROGLIDE™ GUIDEWIRE

## 2024-01-05 RX ORDER — FAMOTIDINE 10 MG/ML
20 INJECTION INTRAVENOUS ONCE
Status: COMPLETED | OUTPATIENT
Start: 2024-01-05 | End: 2024-01-05

## 2024-01-05 RX ORDER — CIPROFLOXACIN 500 MG/1
500 TABLET ORAL 2 TIMES DAILY
Qty: 14 TABLET | Refills: 0 | Status: SHIPPED | OUTPATIENT
Start: 2024-01-05 | End: 2024-01-12

## 2024-01-05 RX ORDER — HYDROCODONE BITARTRATE AND ACETAMINOPHEN 5; 325 MG/1; MG/1
1 TABLET ORAL ONCE
Status: DISCONTINUED | OUTPATIENT
Start: 2024-01-05 | End: 2024-01-05 | Stop reason: HOSPADM

## 2024-01-05 RX ORDER — FENTANYL CITRATE 50 UG/ML
INJECTION, SOLUTION INTRAMUSCULAR; INTRAVENOUS AS NEEDED
Status: DISCONTINUED | OUTPATIENT
Start: 2024-01-05 | End: 2024-01-05

## 2024-01-05 RX ORDER — DROPERIDOL 2.5 MG/ML
0.62 INJECTION, SOLUTION INTRAMUSCULAR; INTRAVENOUS ONCE AS NEEDED
Status: DISCONTINUED | OUTPATIENT
Start: 2024-01-05 | End: 2024-01-05 | Stop reason: HOSPADM

## 2024-01-05 RX ORDER — ALBUTEROL SULFATE 0.83 MG/ML
2.5 SOLUTION RESPIRATORY (INHALATION) ONCE AS NEEDED
Status: DISCONTINUED | OUTPATIENT
Start: 2024-01-05 | End: 2024-01-05 | Stop reason: HOSPADM

## 2024-01-05 RX ORDER — PROPOFOL 10 MG/ML
INJECTION, EMULSION INTRAVENOUS CONTINUOUS PRN
Status: DISCONTINUED | OUTPATIENT
Start: 2024-01-05 | End: 2024-01-05

## 2024-01-05 RX ORDER — LIDOCAINE HYDROCHLORIDE 10 MG/ML
0.1 INJECTION, SOLUTION EPIDURAL; INFILTRATION; INTRACAUDAL; PERINEURAL ONCE
Status: DISCONTINUED | OUTPATIENT
Start: 2024-01-05 | End: 2024-01-05 | Stop reason: HOSPADM

## 2024-01-05 RX ORDER — LABETALOL HYDROCHLORIDE 5 MG/ML
5 INJECTION, SOLUTION INTRAVENOUS ONCE AS NEEDED
Status: DISCONTINUED | OUTPATIENT
Start: 2024-01-05 | End: 2024-01-05 | Stop reason: HOSPADM

## 2024-01-05 RX ORDER — LIDOCAINE HYDROCHLORIDE 20 MG/ML
INJECTION, SOLUTION EPIDURAL; INFILTRATION; INTRACAUDAL; PERINEURAL AS NEEDED
Status: DISCONTINUED | OUTPATIENT
Start: 2024-01-05 | End: 2024-01-05

## 2024-01-05 RX ORDER — ONDANSETRON HYDROCHLORIDE 2 MG/ML
4 INJECTION, SOLUTION INTRAVENOUS ONCE AS NEEDED
Status: DISCONTINUED | OUTPATIENT
Start: 2024-01-05 | End: 2024-01-05 | Stop reason: HOSPADM

## 2024-01-05 RX ORDER — LIDOCAINE HYDROCHLORIDE 20 MG/ML
JELLY TOPICAL AS NEEDED
Status: DISCONTINUED | OUTPATIENT
Start: 2024-01-05 | End: 2024-01-05 | Stop reason: HOSPADM

## 2024-01-05 RX ORDER — MORPHINE SULFATE 2 MG/ML
2 INJECTION, SOLUTION INTRAMUSCULAR; INTRAVENOUS EVERY 5 MIN PRN
Status: DISCONTINUED | OUTPATIENT
Start: 2024-01-05 | End: 2024-01-05 | Stop reason: HOSPADM

## 2024-01-05 RX ORDER — KETOROLAC TROMETHAMINE 30 MG/ML
INJECTION, SOLUTION INTRAMUSCULAR; INTRAVENOUS AS NEEDED
Status: DISCONTINUED | OUTPATIENT
Start: 2024-01-05 | End: 2024-01-05

## 2024-01-05 RX ORDER — DIPHENHYDRAMINE HYDROCHLORIDE 50 MG/ML
12.5 INJECTION INTRAMUSCULAR; INTRAVENOUS ONCE AS NEEDED
Status: DISCONTINUED | OUTPATIENT
Start: 2024-01-05 | End: 2024-01-05 | Stop reason: HOSPADM

## 2024-01-05 RX ORDER — KETOROLAC TROMETHAMINE 10 MG/1
10 TABLET, FILM COATED ORAL EVERY 6 HOURS PRN
Qty: 20 TABLET | Refills: 0 | Status: SHIPPED | OUTPATIENT
Start: 2024-01-05

## 2024-01-05 RX ORDER — HYDROCODONE BITARTRATE AND ACETAMINOPHEN 5; 325 MG/1; MG/1
1 TABLET ORAL EVERY 6 HOURS PRN
Status: DISCONTINUED | OUTPATIENT
Start: 2024-01-05 | End: 2024-01-05 | Stop reason: HOSPADM

## 2024-01-05 RX ORDER — MEPERIDINE HYDROCHLORIDE 25 MG/ML
12.5 INJECTION INTRAMUSCULAR; INTRAVENOUS; SUBCUTANEOUS EVERY 10 MIN PRN
Status: DISCONTINUED | OUTPATIENT
Start: 2024-01-05 | End: 2024-01-05 | Stop reason: HOSPADM

## 2024-01-05 RX ORDER — SODIUM CHLORIDE, SODIUM LACTATE, POTASSIUM CHLORIDE, CALCIUM CHLORIDE 600; 310; 30; 20 MG/100ML; MG/100ML; MG/100ML; MG/100ML
100 INJECTION, SOLUTION INTRAVENOUS CONTINUOUS
Status: DISCONTINUED | OUTPATIENT
Start: 2024-01-05 | End: 2024-01-05 | Stop reason: HOSPADM

## 2024-01-05 RX ORDER — OXYCODONE AND ACETAMINOPHEN 5; 325 MG/1; MG/1
1 TABLET ORAL EVERY 4 HOURS PRN
Status: DISCONTINUED | OUTPATIENT
Start: 2024-01-05 | End: 2024-01-05 | Stop reason: HOSPADM

## 2024-01-05 RX ORDER — HYDROCODONE BITARTRATE AND ACETAMINOPHEN 7.5; 325 MG/15ML; MG/15ML
5 SOLUTION ORAL EVERY 6 HOURS PRN
Status: DISCONTINUED | OUTPATIENT
Start: 2024-01-05 | End: 2024-01-05

## 2024-01-05 RX ORDER — MIDAZOLAM HYDROCHLORIDE 1 MG/ML
INJECTION, SOLUTION INTRAMUSCULAR; INTRAVENOUS AS NEEDED
Status: DISCONTINUED | OUTPATIENT
Start: 2024-01-05 | End: 2024-01-05

## 2024-01-05 RX ORDER — HYDRALAZINE HYDROCHLORIDE 20 MG/ML
5 INJECTION INTRAMUSCULAR; INTRAVENOUS EVERY 30 MIN PRN
Status: DISCONTINUED | OUTPATIENT
Start: 2024-01-05 | End: 2024-01-05 | Stop reason: HOSPADM

## 2024-01-05 RX ORDER — ONDANSETRON HYDROCHLORIDE 2 MG/ML
INJECTION, SOLUTION INTRAVENOUS AS NEEDED
Status: DISCONTINUED | OUTPATIENT
Start: 2024-01-05 | End: 2024-01-05

## 2024-01-05 RX ORDER — CEFAZOLIN SODIUM 2 G/100ML
2 INJECTION, SOLUTION INTRAVENOUS ONCE
Status: COMPLETED | OUTPATIENT
Start: 2024-01-05 | End: 2024-01-05

## 2024-01-05 RX ORDER — DEXAMETHASONE SODIUM PHOSPHATE 100 MG/10ML
INJECTION INTRAMUSCULAR; INTRAVENOUS AS NEEDED
Status: DISCONTINUED | OUTPATIENT
Start: 2024-01-05 | End: 2024-01-05

## 2024-01-05 RX ADMIN — FENTANYL CITRATE 50 MCG: 50 INJECTION, SOLUTION INTRAMUSCULAR; INTRAVENOUS at 12:19

## 2024-01-05 RX ADMIN — CEFAZOLIN SODIUM 2 G: 2 INJECTION, SOLUTION INTRAVENOUS at 12:18

## 2024-01-05 RX ADMIN — FAMOTIDINE 20 MG: 10 INJECTION, SOLUTION INTRAVENOUS at 11:58

## 2024-01-05 RX ADMIN — LIDOCAINE HYDROCHLORIDE 60 MG: 20 INJECTION, SOLUTION EPIDURAL; INFILTRATION; INTRACAUDAL; PERINEURAL at 12:26

## 2024-01-05 RX ADMIN — PROPOFOL 120 MCG/KG/MIN: 10 INJECTION, EMULSION INTRAVENOUS at 12:26

## 2024-01-05 RX ADMIN — ONDANSETRON 4 MG: 2 INJECTION, SOLUTION INTRAMUSCULAR; INTRAVENOUS at 12:25

## 2024-01-05 RX ADMIN — DEXAMETHASONE SODIUM PHOSPHATE 4 MG: 10 INJECTION INTRAMUSCULAR; INTRAVENOUS at 12:28

## 2024-01-05 RX ADMIN — KETOROLAC TROMETHAMINE 30 MG: 30 INJECTION, SOLUTION INTRAMUSCULAR; INTRAVENOUS at 12:50

## 2024-01-05 RX ADMIN — FENTANYL CITRATE 50 MCG: 50 INJECTION, SOLUTION INTRAMUSCULAR; INTRAVENOUS at 12:25

## 2024-01-05 RX ADMIN — FENTANYL CITRATE 50 MCG: 50 INJECTION, SOLUTION INTRAMUSCULAR; INTRAVENOUS at 12:39

## 2024-01-05 RX ADMIN — MIDAZOLAM HYDROCHLORIDE 2 MG: 1 INJECTION, SOLUTION INTRAMUSCULAR; INTRAVENOUS at 12:17

## 2024-01-05 RX ADMIN — SODIUM CHLORIDE, POTASSIUM CHLORIDE, SODIUM LACTATE AND CALCIUM CHLORIDE 100 ML/HR: 600; 310; 30; 20 INJECTION, SOLUTION INTRAVENOUS at 11:58

## 2024-01-05 SDOH — HEALTH STABILITY: MENTAL HEALTH: CURRENT SMOKER: 0

## 2024-01-05 ASSESSMENT — PAIN - FUNCTIONAL ASSESSMENT
PAIN_FUNCTIONAL_ASSESSMENT: 0-10

## 2024-01-05 ASSESSMENT — COLUMBIA-SUICIDE SEVERITY RATING SCALE - C-SSRS
2. HAVE YOU ACTUALLY HAD ANY THOUGHTS OF KILLING YOURSELF?: NO
1. IN THE PAST MONTH, HAVE YOU WISHED YOU WERE DEAD OR WISHED YOU COULD GO TO SLEEP AND NOT WAKE UP?: NO
6. HAVE YOU EVER DONE ANYTHING, STARTED TO DO ANYTHING, OR PREPARED TO DO ANYTHING TO END YOUR LIFE?: NO

## 2024-01-05 ASSESSMENT — PAIN SCALES - GENERAL
PAINLEVEL_OUTOF10: 0 - NO PAIN
PAINLEVEL_OUTOF10: 0 - NO PAIN
PAINLEVEL_OUTOF10: 1
PAINLEVEL_OUTOF10: 10 - WORST POSSIBLE PAIN
PAINLEVEL_OUTOF10: 10 - WORST POSSIBLE PAIN
PAINLEVEL_OUTOF10: 0 - NO PAIN
PAIN_LEVEL: 4
PAINLEVEL_OUTOF10: 0 - NO PAIN

## 2024-01-05 NOTE — ANESTHESIA PREPROCEDURE EVALUATION
Patient: Maicol Haley    Procedure Information       Date/Time: 01/05/24 1230    Procedure: Cystoscopy with LEFT Insertion Stent Ureter (C ARM) (Left)    Location: [facility] / [facility]    Surgeons: Benitez Grimes MD            Relevant Problems   Anesthesia (within normal limits)      Cardiovascular   (+) Benign essential hypertension      Endocrine   (+) Class 2 severe obesity due to excess calories with serious comorbidity and body mass index (BMI) of 37.0 to 37.9 in adult (CMS/[facility])      /Renal   (+) Kidney stones      Neuro/Psych   (+) Low back pain with sciatica      Infectious Disease   (+) Fungal dermatitis      Other   (+) Arthritis of right hip   (+) Facet arthritis of lumbosacral region       Clinical information reviewed:   Tobacco  Allergies  Meds   Med Hx  Surg Hx   Fam Hx  Soc Hx        NPO Detail:  No data recorded     Physical Exam    Airway  Mallampati: II  TM distance: >3 FB     Cardiovascular - normal exam     Dental - normal exam     Pulmonary - normal exam     Abdominal - normal exam             Anesthesia Plan    ASA 2     general     The patient is not a current smoker.    intravenous induction   Postoperative administration of opioids is intended.  Anesthetic plan and risks discussed with patient.  Use of blood products discussed with patient who.    Plan discussed with CRNA.

## 2024-01-05 NOTE — ANESTHESIA POSTPROCEDURE EVALUATION
Patient: Maicol Haley    Procedure Summary       Date: 01/05/24 Room / Location: [facility]    Anesthesia Start: 1212 Anesthesia Stop: 1300    Procedure: Cystoscopy with LEFT Insertion Stent Ureter (C ARM) (Left) Diagnosis:       Ureteral stone      (Ureteral stone [N20.1])    Surgeons: Benitez Grimes MD Responsible Provider: DIDIER Tucker    Anesthesia Type: general ASA Status: 2            Anesthesia Type: general    Vitals Value Taken Time   /80 01/05/24 1400   Temp 36.7 °C (98.1 °F) 01/05/24 1400   Pulse 56 01/05/24 1400   Resp 13 01/05/24 1400   SpO2 100 % 01/05/24 1348   Vitals shown include unvalidated device data.    Anesthesia Post Evaluation    Patient location during evaluation: PACU  Patient participation: complete - patient participated  Level of consciousness: awake  Pain score: 4  Pain management: adequate  Airway patency: patent  Cardiovascular status: acceptable  Respiratory status: acceptable  Hydration status: acceptable  Postoperative Nausea and Vomiting: none        There were no known notable events for this encounter.

## 2024-01-05 NOTE — OP NOTE
Cystoscopy with LEFT Insertion Stent Ureter (C ARM) (L) Operative Note     Date: 2024  OR Location: [facility]    Name: Maicol Haley, : 1980, Age: 43 y.o., MRN: [id number], Sex: male    Diagnosis  Pre-op Diagnosis     * Ureteral stone [N20.1] Post-op Diagnosis     * Ureteral stone [N20.1]     Procedures  Cystoscopy, left retrograde pyelogram ureteroscopy, left stent insertion      Surgeons      * Benitez Grimes - Primary    Resident/Fellow/Other Assistant:  Surgeon(s) and Role:    Procedure Summary  Anesthesia: General  ASA: II  Anesthesia Staff: CRNA: BHASKAR Tucker-CRNA  Estimated Blood Loss: 0mL  Intra-op Medications:   Medication Name Total Dose   lidocaine (Uro-Jet) 2 % gel 1 Application   iohexol (OMNIPaque) 300 mg iodine/mL solution 2 mL   lactated Ringer's infusion Cannot be calculated              Anesthesia Record               Intraprocedure I/O Totals          Intake    Propofol Drip 0.00 mL    The total shown is the total volume documented since Anesthesia Start was filed.    lactated Ringer's infusion 700.00 mL    ceFAZolin in dextrose (iso-os) (Ancef) IVPB 2 g 100.00 mL    Total Intake 800 mL          Specimen: No specimens collected     Staff:   Circulator: Destiney Manzano RN         Drains and/or Catheters: * None in log *    Tourniquet Times:         Implants:  Implants       Type Name Action Serial No.       6 FR X 26CM INLAY URETERAL STENT, DOUBLE PIGTAIL, W/HYDROGLIDE GUIDEWIRE Implanted               Findings: Mild urethral stricture, significant left distal ureteral edema about 3 cm, no stone    Indications: Maicol Haley is an 43 y.o. male who is having surgery for Ureteral stone [N20.1].  Status post ureteroscopy, left flank pain, left hydronephrosis    The patient was seen in the preoperative area. The risks, benefits, complications, treatment options, non-operative alternatives, expected recovery and outcomes were discussed  with the patient. The possibilities of reaction to medication, pulmonary aspiration, injury to surrounding structures, bleeding, recurrent infection, the need for additional procedures, failure to diagnose a condition, and creating a complication requiring transfusion or operation were discussed with the patient. The patient concurred with the proposed plan, giving informed consent.  The site of surgery was properly noted/marked if necessary per policy. The patient has been actively warmed in preoperative area. Preoperative antibiotics have been ordered and given within 1 hours of incision. Venous thrombosis prophylaxis have been ordered including bilateral sequential compression devices    Procedure Details: Patient was identified in the preoperative holding area and brought into the room, placed on supine position. After a general anesthesia was induced, patient was repositioned in a dorsal lithotomy position, genitalia area was prepped and draped in a routine standard fashion.  The urethral dilatation was performed A cystoscopy was performed with a 22F Olympus cystoscope, and the findings include normal urethra, normal bladder, significant edematous and erythematous at left ureteral orifice, no tumor no stone. A retrograde pyelogram was performed on the left side, findings include a 3 cm narrowing at left distal ureter, no filling defects left ureter,  therefore a 035 Glidewire was inserted followed by an insertion of semirigid ureteroscope.  Distal section of ureter was evaluated and significant edema erythematous but no stone no tumor.  The ureteroscope was removed and a cystoscope brought back in, then a 6 Kinyarwanda 26 cm double-J ureteral stent inserted and its position was confirmed on fluoroscopy. Bladder was emptied and scope removed.  Patient extubated and returned to PACU in a stable condition.  Complications:  None; patient tolerated the procedure well.    Disposition: PACU - hemodynamically  stable.  Condition: stable         Plan  Toradol x 20  Cipro 500 mg twice daily for 7 days  Appointment in 2 weeks for stent removal    Attending Attestation:     Benitez Grimes  Phone Number: [phone]

## 2024-01-19 ENCOUNTER — PROCEDURE VISIT (OUTPATIENT)
Dept: UROLOGY | Facility: CLINIC | Age: 44
End: 2024-01-19
Payer: COMMERCIAL

## 2024-01-19 DIAGNOSIS — N20.0 KIDNEY STONES: ICD-10-CM

## 2024-01-19 PROCEDURE — 52310 CYSTOSCOPY AND TREATMENT: CPT | Performed by: UROLOGY

## 2024-01-19 RX ORDER — LIDOCAINE HYDROCHLORIDE 20 MG/ML
1 JELLY TOPICAL ONCE
Status: COMPLETED | OUTPATIENT
Start: 2024-01-19 | End: 2024-01-19

## 2024-01-19 RX ADMIN — LIDOCAINE HYDROCHLORIDE 1 APPLICATION: 20 JELLY TOPICAL at 13:43

## 2024-01-19 NOTE — PROGRESS NOTES
01/19/2024  Cystoscopy left stent removal    Patient doing well after left stent insertion    A cystoscopy and left stent removal was performed    Pain evaluation: 0/10 before, 2/10 after.    We discussed kidney stone prevention kidney stone diet  All the questions were answered, the patient expressed understanding and agreed to the plan.    Impression  Left flank pain  Left ureteral stone     Plan:  Stone education  Follow-up as needed      Chief Complaint   Patient presents with    Stent removal      Patient states here for stent removal .           Physical Exam     TODAYS LAB RESULTS:  No Urine Sample     ASSESSMENT&PLAN:      IMPRESSIONS:     12/28/2023  Intermittent left flank pain for a week     CT scan demonstrated a 3 mm left mid ureteral stone        I talked to patient about options for the kidney stones, #1 watchful waiting, #2 surgical intervention, cystoscopy ureteroscopy holmium laser lithotripsy and stent insertion. All questions were answered, patient expressed understanding and agreed to the plan.     Impression  Left flank pain  Left ureteral stone     Plan:  Cystoscopy ureteroscopy holmium laser lithotripsy and left stent insertion;  Cipro 500 mg twice a day x7 days;  Norco 5/325 one to 2 tablets every 4-6 hours x30;  Flomax 0.4 mg daily at bedtime x7;(patient has medication at home)  Return to office 2 weeks after surgery.          Chief Complaint   Patient presents with    Nephrolithiasis       Patient is here today for left flank pain x 1 week.  Patient went to the ER.         Physical Exam      TODAYS LAB RESULTS:  === 12/20/23 ===     CT ABDOMEN PELVIS WO IV CONTRAST     - Impression -  1. 0.3 cm calculus within the left mid ureter contributing to very  mild obstruction with mild left perinephric fat stranding and slight  prominence of the left proximal ureter. No hydronephrosis. No  additional urinary calculi.     Signed by: Roman Rehman 12/21/2023 1:17 AM  Dictation workstation:    [id number]      Component  Ref Range & Units 13:17   POC Glucose, Urine  NEGATIVE mg/dl NEGATIVE   POC Bilirubin, Urine  NEGATIVE NEGATIVE   POC Ketones, Urine  NEGATIVE mg/dl NEGATIVE   POC Specific Gravity, Urine  1.005 - 1.035 1.015   POC Blood, Urine  NEGATIVE TRACE-Intact Abnormal    POC PH, Urine  No Reference Range Established PH 6.0   POC Protein, Urine  NEGATIVE, 30 (1+) mg/dl NEGATIVE   POC Urobilinogen, Urine  0.2, 1.0 EU/DL 0.2   Poc Nitrite, Urine  NEGATIVE NEGATIVE   POC Leukocytes, Urine  NEGATIVE NEGATIVE            ASSESSMENT&PLAN:        IMPRESSIONS:       06/03/2021  40-year-old gentleman developed acute onset of right flank pain, he has had visited the ER twice for small right distal ureteral stone. Currently he has 4/10 pain.     Patient has no nausea, no vomiting, no fever.     IO UA (automated w/o microscopy)           03Jun2021 09:37AM          [employer]     Test Name       Result     Flag        Reference  IO Glucose - Urine         Negative                  IO Bilirubin       Negative                  IO Ketones      Negative                  IO Specific Gravity         1.030                       IO Blood          Negative                  IO pH               6.5                           IO Protein, Urine            Negative                  IO Urobilinogen              1 mg/dl                    IO Nitrite, Urine              Negative                  IO Leukocytes Negative                  IO Glucose - Urine         Negative                  IO Bilirubin       Negative                  IO Ketones      Negative                  IO Specific Gravity         1.030                       IO Blood          Negative                  IO pH               6.5                           IO Protein, Urine            Negative                  IO Urobilinogen              1 mg/dl                    IO Nitrite, Urine              Negative                  IO Leukocytes Negative                                                             CT abdomen and pelvis without contrast  IMPRESSION:  1. 2.5 mm stone distal right ureter without hydronephrosis.     CT scan abdomen and pelvis resolved demonstrates     I talked to patient about options for the kidney stones, #1 watchful waiting, #2 surgical intervention, cystoscopy ureteroscopy holmium laser lithotripsy and stent insertion. All questions were answered, patient expressed understanding and agreed to the plan.     Impression  Right flank pain  Right ureteral stone     Plan:  Cystoscopy ureteroscopy holmium laser lithotripsy and right stent insertion;  Return to office 2 weeks after surgery.        Chief Complaint     New Patient here for ER follow up from 5/26/21 and 5/29/21. CT done 5/26/21: IMPRESSION:  1. 2.5 mm stone distal right ureter without hydronephrosis.      History of Present Illness  On a scale of 0 to 10, the patient rates the pain at 8.   Pain Location: left flank.   Pain Quality: Stabbing.         06/03/2021  40-year-old gentleman developed acute onset of right flank pain, he has had visited the ER twice for small right distal ureteral stone. Currently he has 4/10 pain.     Patient has no nausea, no vomiting, no fever.     IO UA (automated w/o microscopy)           03Jun2021 09:37AM          [employer] Grimes     Test Name       Result     Flag        Reference  IO Glucose - Urine         Negative                  IO Bilirubin       Negative                  IO Ketones      Negative                  IO Specific Gravity         1.030                       IO Blood          Negative                  IO pH               6.5                           IO Protein, Urine            Negative                  IO Urobilinogen              1 mg/dl                    IO Nitrite, Urine              Negative                  IO Leukocytes Negative                  IO Glucose - Urine         Negative                  IO Bilirubin       Negative                  IO  Ketones      Negative                  IO Specific Gravity         1.030                       IO Blood          Negative                  IO pH               6.5                           IO Protein, Urine            Negative                  IO Urobilinogen              1 mg/dl                    IO Nitrite, Urine              Negative                  IO Leukocytes Negative                                                            CT abdomen and pelvis without contrast  IMPRESSION:  1. 2.5 mm stone distal right ureter without hydronephrosis.     CT scan abdomen and pelvis resolved demonstrates     I talked to patient about options for the kidney stones, #1 watchful waiting, #2 surgical intervention, cystoscopy ureteroscopy holmium laser lithotripsy and stent insertion. All questions were answered, patient expressed understanding and agreed to the plan.     Impression  Right flank pain  Right ureteral stone     Plan:  Cystoscopy ureteroscopy holmium laser lithotripsy and right stent insertion;  Return to office 2 weeks after surgery.       Surgery  1/5/2024 cystoscopy left ureteral stent insertion  1/2/2024 cystoscopy retrograde pyelogram left ureteroscopy

## 2024-01-22 ENCOUNTER — APPOINTMENT (OUTPATIENT)
Dept: UROLOGY | Facility: CLINIC | Age: 44
End: 2024-01-22
Payer: COMMERCIAL

## 2024-06-17 DIAGNOSIS — I10 BENIGN ESSENTIAL HYPERTENSION: ICD-10-CM

## 2024-06-17 RX ORDER — LISINOPRIL 10 MG/1
10 TABLET ORAL DAILY
Qty: 90 TABLET | Refills: 3 | OUTPATIENT
Start: 2024-06-17

## 2024-08-12 DIAGNOSIS — I10 BENIGN ESSENTIAL HYPERTENSION: ICD-10-CM

## 2024-08-12 RX ORDER — LISINOPRIL 10 MG/1
10 TABLET ORAL DAILY
Qty: 90 TABLET | Refills: 1 | Status: SHIPPED | OUTPATIENT
Start: 2024-08-12 | End: 2025-08-12

## 2024-08-12 NOTE — TELEPHONE ENCOUNTER
Rx Refill Request Telephone Encounter    Name:  Maicol March Tera  :  [id number]  Medication Name:   lisinopril 10 mg tablet  Patient takes 1 tablet daily  Patient is almost out of medication  Medication is under         Specific Pharmacy location:    Date of last appointment:  23  Date of next appointment:  24  Best number to reach patient:

## 2024-08-14 RX ORDER — LISINOPRIL 10 MG/1
10 TABLET ORAL DAILY
Qty: 90 TABLET | Refills: 1 | Status: CANCELLED | OUTPATIENT
Start: 2024-08-14 | End: 2025-08-14

## 2024-08-14 NOTE — TELEPHONE ENCOUNTER
Maicol's wife called in to get an update on his lisinopril prescription refill from Monday 8/12/24? She stated that he has not had any today and is worried    Please advise

## 2024-09-17 ENCOUNTER — APPOINTMENT (OUTPATIENT)
Dept: PRIMARY CARE | Facility: CLINIC | Age: 44
End: 2024-09-17
Payer: COMMERCIAL

## 2024-09-17 VITALS
BODY MASS INDEX: 39.15 KG/M2 | WEIGHT: 235 LBS | OXYGEN SATURATION: 97 % | HEIGHT: 65 IN | RESPIRATION RATE: 14 BRPM | DIASTOLIC BLOOD PRESSURE: 89 MMHG | HEART RATE: 68 BPM | TEMPERATURE: 97.4 F | SYSTOLIC BLOOD PRESSURE: 139 MMHG

## 2024-09-17 DIAGNOSIS — Z13.29 THYROID DISORDER SCREEN: ICD-10-CM

## 2024-09-17 DIAGNOSIS — G47.33 OSA (OBSTRUCTIVE SLEEP APNEA): ICD-10-CM

## 2024-09-17 DIAGNOSIS — I10 BENIGN ESSENTIAL HYPERTENSION: ICD-10-CM

## 2024-09-17 DIAGNOSIS — E66.01 SEVERE OBESITY (BMI 35.0-39.9) WITH COMORBIDITY (MULTI): ICD-10-CM

## 2024-09-17 DIAGNOSIS — Z13.220 SCREENING FOR HYPERLIPIDEMIA: ICD-10-CM

## 2024-09-17 DIAGNOSIS — Z00.00 ANNUAL PHYSICAL EXAM: Primary | ICD-10-CM

## 2024-09-17 DIAGNOSIS — R19.06 ABDOMINAL WALL MASS OF EPIGASTRIC REGION: ICD-10-CM

## 2024-09-17 DIAGNOSIS — L98.9 SKIN LESION: ICD-10-CM

## 2024-09-17 DIAGNOSIS — E55.9 VITAMIN D DEFICIENCY: ICD-10-CM

## 2024-09-17 PROBLEM — E66.812 CLASS 2 SEVERE OBESITY DUE TO EXCESS CALORIES WITH SERIOUS COMORBIDITY AND BODY MASS INDEX (BMI) OF 37.0 TO 37.9 IN ADULT: Status: RESOLVED | Noted: 2023-02-09 | Resolved: 2024-09-17

## 2024-09-17 PROBLEM — N20.1 URETERAL STONE: Status: RESOLVED | Noted: 2023-12-28 | Resolved: 2024-09-17

## 2024-09-17 PROBLEM — B36.9 FUNGAL DERMATITIS: Status: RESOLVED | Noted: 2023-02-09 | Resolved: 2024-09-17

## 2024-09-17 PROCEDURE — 3008F BODY MASS INDEX DOCD: CPT | Performed by: FAMILY MEDICINE

## 2024-09-17 PROCEDURE — 1036F TOBACCO NON-USER: CPT | Performed by: FAMILY MEDICINE

## 2024-09-17 PROCEDURE — 99396 PREV VISIT EST AGE 40-64: CPT | Performed by: FAMILY MEDICINE

## 2024-09-17 PROCEDURE — 3079F DIAST BP 80-89 MM HG: CPT | Performed by: FAMILY MEDICINE

## 2024-09-17 PROCEDURE — 3075F SYST BP GE 130 - 139MM HG: CPT | Performed by: FAMILY MEDICINE

## 2024-09-17 PROCEDURE — 99214 OFFICE O/P EST MOD 30 MIN: CPT | Performed by: FAMILY MEDICINE

## 2024-09-17 ASSESSMENT — ENCOUNTER SYMPTOMS
VOMITING: 0
ABDOMINAL DISTENTION: 0
NAUSEA: 0
PALPITATIONS: 0
ABDOMINAL PAIN: 0
FEVER: 0
SHORTNESS OF BREATH: 0
CHILLS: 0
ARTHRALGIAS: 0
CHEST TIGHTNESS: 0
CONFUSION: 0

## 2024-09-17 ASSESSMENT — PATIENT HEALTH QUESTIONNAIRE - PHQ9
SUM OF ALL RESPONSES TO PHQ9 QUESTIONS 1 AND 2: 0
1. LITTLE INTEREST OR PLEASURE IN DOING THINGS: NOT AT ALL
2. FEELING DOWN, DEPRESSED OR HOPELESS: NOT AT ALL

## 2024-09-17 NOTE — PROGRESS NOTES
"Subjective   Patient ID: Maicol Haley is a 44 y.o. male who presents for Annual Exam.    HPI patient in today for annual checkup and review of ongoing healthcare issues.  Also is complaining of a mildly tender subcutaneous nodule near the midepigastric region.  He says appetites been good bowel habits have been normal.  Weights been stable.  Also patient states that periodically during the week least 3-4 times he wakes up feeling somewhat short of breath and almost panicking.  He said years ago following the death of his father he had somewhat similar issues he is not sure that there is a connection.  He denies otherwise feeling depressed or anxious.    Review of Systems   Constitutional:  Negative for chills and fever.   HENT:  Negative for congestion and ear pain.    Eyes:  Negative for visual disturbance.   Respiratory:  Negative for chest tightness and shortness of breath.    Cardiovascular:  Negative for chest pain and palpitations.   Gastrointestinal:  Negative for abdominal distention, abdominal pain, nausea and vomiting.   Musculoskeletal:  Negative for arthralgias.   Skin:  Negative for pallor.   Psychiatric/Behavioral:  Negative for confusion.        Objective   /89   Pulse 68   Temp 36.3 °C (97.4 °F)   Resp 14   Ht 1.651 m (5' 5\")   Wt 107 kg (235 lb)   SpO2 97%   BMI 39.11 kg/m²     Physical Exam  Vitals and nursing note reviewed.   Constitutional:       General: He is not in acute distress.     Appearance: Normal appearance. He is not ill-appearing.   HENT:      Head: Normocephalic and atraumatic.      Right Ear: Tympanic membrane, ear canal and external ear normal.      Left Ear: Tympanic membrane, ear canal and external ear normal.      Mouth/Throat:      Pharynx: Oropharynx is clear.   Eyes:      Extraocular Movements: Extraocular movements intact.   Cardiovascular:      Rate and Rhythm: Normal rate and regular rhythm.      Pulses: Normal pulses.      Heart sounds: " Normal heart sounds.   Pulmonary:      Effort: Pulmonary effort is normal.      Breath sounds: Normal breath sounds.   Abdominal:      General: Abdomen is flat. Bowel sounds are normal.      Palpations: Abdomen is soft. There is mass.      Tenderness: There is no abdominal tenderness. There is no guarding or rebound.      Comments: Mildly tender subcutaneous/abdominal wall nodule noted near the right mid epigastric region.   Musculoskeletal:         General: Normal range of motion.      Cervical back: Neck supple.   Skin:     General: Skin is warm.      Findings: Lesion present.      Comments: Irregularly shaped and colored skin lesion right upper quadrant abdominal wall.   Neurological:      Mental Status: He is alert and oriented to person, place, and time. Mental status is at baseline.   Psychiatric:         Mood and Affect: Mood normal.     Check fasting lab work  Continue lisinopril  Overnight sleep study rule out sleep apnea  General Surgery referral regarding midepigastric abdominal wall mass  Otology referral regarding skin lesion    Return to our office in roughly 10 weeks to reassess his case and review specialty input and test results.    Assessment/Plan   Problem List Items Addressed This Visit             ICD-10-CM    Benign essential hypertension I10     Continue lisinopril 10 mg daily         Relevant Orders    CBC    Comprehensive Metabolic Panel    Follow Up In Primary Care - Established    Vitamin D deficiency E55.9     Continue to monitor supplement as needed         Relevant Orders    Vitamin D 25-Hydroxy,Total (for eval of Vitamin D levels)    Screening for hyperlipidemia Z13.220     Check fasting lipid         Relevant Orders    Lipid Panel    Thyroid disorder screen Z13.29     TSH with reflex         Relevant Orders    TSH with reflex to Free T4 if abnormal    CARA (obstructive sleep apnea) G47.33     Check overnight sleep study further plans pending results         Relevant Orders    In-Center  Sleep Study    Skin lesion L98.9     Irregularly shaped and colored skin lesion right upper abdominal wall refer to dermatology         Relevant Orders    Referral to Dermatology    Follow Up In Primary Care - Established    Abdominal wall mass of epigastric region R19.06     Subcutaneous mildly tender abdominal wall mass.  Referred to general surgeon for second opinion.         Relevant Orders    Referral to General Surgery    Follow Up In Primary Care - Established    Annual physical exam - Primary Z00.00     Check fasting lab work    Reviewed recommendations regards to immunizations         Severe obesity (BMI 35.0-39.9) with comorbidity (Multi) E66.01     Work on dietary lifestyle modifications and weight loss strategies.

## 2024-10-01 ENCOUNTER — APPOINTMENT (OUTPATIENT)
Facility: CLINIC | Age: 44
End: 2024-10-01
Payer: COMMERCIAL

## 2024-10-01 VITALS
WEIGHT: 235.4 LBS | OXYGEN SATURATION: 98 % | HEART RATE: 79 BPM | BODY MASS INDEX: 39.22 KG/M2 | DIASTOLIC BLOOD PRESSURE: 115 MMHG | SYSTOLIC BLOOD PRESSURE: 152 MMHG | HEIGHT: 65 IN

## 2024-10-01 DIAGNOSIS — R10.13 EPIGASTRIC PAIN: ICD-10-CM

## 2024-10-01 PROCEDURE — 99203 OFFICE O/P NEW LOW 30 MIN: CPT | Performed by: SURGERY

## 2024-10-01 PROCEDURE — 3077F SYST BP >= 140 MM HG: CPT | Performed by: SURGERY

## 2024-10-01 PROCEDURE — 3008F BODY MASS INDEX DOCD: CPT | Performed by: SURGERY

## 2024-10-01 PROCEDURE — 1036F TOBACCO NON-USER: CPT | Performed by: SURGERY

## 2024-10-01 PROCEDURE — 3080F DIAST BP >= 90 MM HG: CPT | Performed by: SURGERY

## 2024-10-01 ASSESSMENT — ENCOUNTER SYMPTOMS
CHILLS: 0
SHORTNESS OF BREATH: 0
VOMITING: 0
COUGH: 0
ABDOMINAL PAIN: 1
HEMATURIA: 0
DYSURIA: 0
MYALGIAS: 0
FLANK PAIN: 0
CONSTIPATION: 0
EYE PAIN: 0
BRUISES/BLEEDS EASILY: 0
HEADACHES: 0
FREQUENCY: 0
DIARRHEA: 0
ARTHRALGIAS: 0
WEAKNESS: 0
SPEECH DIFFICULTY: 0
CONFUSION: 0
FEVER: 0
WOUND: 0
EYE REDNESS: 0
FATIGUE: 0
NAUSEA: 0
AGITATION: 0
POLYPHAGIA: 0

## 2024-10-01 NOTE — PATIENT INSTRUCTIONS
1.  I did have mass of your upper abdomen in the area of concern is not palpated on physical exam, nor is there any CT evidence of any abnormality in that region.  This pain is mostly musculoskeletal.  Recommend avoiding local trauma to the region.  If you do have pain, you may apply ice to the area for 20 minutes 3 times a day to help with the pain.  You may also take Tylenol or ibuprofen to help with the discomfort.  2.  If your symptoms change, or you start to feel an enlarged mass, please call Dr. Armijo's office back for reevaluation.

## 2024-10-01 NOTE — LETTER
October 1, 2024     Horacio Palomino MD  [address]  [facility], [address] [place] [id number]    Patient: Maicol Haley   YOB: 1980   Date of Visit: 10/1/2024       Dear Dr. Horacio Palomino MD:    Thank you for referring Maicol Haley to me for evaluation. Below are my notes for this consultation.  If you have questions, please do not hesitate to call me. I look forward to following your patient along with you.       Sincerely,     Salina Armijo MD      CC: No Recipients  ______________________________________________________________________________________        GENERAL SURGERY OFFICE NOTE    Patient: Miacol Haley    Age: 44 y.o.   Gender: male    MRN: [id number]    PCP: Horacio Palomino MD        SUBJECTIVE     Chief Complaint  New Patient Visit (Patient was referred by Dr. Palomino for abdominal wall mass. Patient states that he has had the mass for a couple of years. Patient states that the mass has not changed in size. Patient states that he has pain with touch. )       ERNESTINE Milian is a 44-year-old white male who I am seeing in consultation at the request of his primary care physician for evaluation of epigastric abdominal pain.  He states that over the last several years, he has had intermittent pain in the high epigastric region just to the right of the midline.  When he is doing activities, or working out in the gym, he does not have any associated pain.  However, when direct pressure is applied to the area, or his wife accidentally hits him in the area with her elbow, there is pain in this region.  He believes there is a lump in this region, but that the lump is not changing or growing in the last several years.  He also notices some occasional right sided abdominal cramps.  He is able to eat and drink well, and has actually gained weight in the last few years.  He did have a CT evaluation of his abdomen/pelvis  approximately 9 months ago when he had a kidney stone, and no abnormality of the abdominal wall was seen.    ROS  Review of Systems   Constitutional:  Negative for chills, fatigue and fever.   HENT:  Negative for congestion, ear pain and hearing loss.    Eyes:  Negative for pain and redness.   Respiratory:  Negative for cough and shortness of breath.    Cardiovascular:  Negative for chest pain and leg swelling.   Gastrointestinal:  Positive for abdominal pain. Negative for constipation, diarrhea, nausea and vomiting.   Endocrine: Negative for polyphagia.   Genitourinary:  Negative for dysuria, flank pain, frequency and hematuria.   Musculoskeletal:  Negative for arthralgias and myalgias.   Skin:  Negative for rash and wound.   Allergic/Immunologic: Negative for immunocompromised state.   Neurological:  Negative for speech difficulty, weakness and headaches.   Hematological:  Does not bruise/bleed easily.   Psychiatric/Behavioral:  Negative for agitation and confusion.           HISTORY     Past Medical History:   Diagnosis Date   • Hypertension         Past Surgical History:   Procedure Laterality Date   • KIDNEY STONE SURGERY      x2   • OTHER SURGICAL HISTORY  2021    Cholecystectomy        Family History   Problem Relation Name Age of Onset   • Diabetes Mother     • Hypertension Mother     • Esophageal cancer Father     • Hypertension Maternal Grandfather     • Other (Cardiac disorder) Other     • Diabetes Other     • Other (Malignant Neoplasm) Other     • Other (Hypertension, Benign) Other          No Known Allergies     Social History     Tobacco Use   Smoking Status Former   • Current packs/day: 0.00   • Types: Cigarettes   • Quit date:    • Years since quittin.7   Smokeless Tobacco Never        Social History     Substance and Sexual Activity   Alcohol Use Yes   • Alcohol/week: 1.0 standard drink of alcohol   • Types: 1 Cans of beer per week        HOME MEDICATIONS  Current Outpatient  "Medications   Medication Instructions   • cholecalciferol (VITAMIN D3) 5,000 Units, oral, Daily   • clotrimazole-betamethasone (Lotrisone) cream Topical, 2 times daily, Apply and rub in a thin film to affected areas   • HYDROcodone-acetaminophen (Norco) 5-325 mg tablet 1 tablet, oral, Every 6 hours PRN   • lisinopril 10 mg, oral, Daily          OBJECTIVE   Last Recorded Vitals.  Blood pressure (!) 152/115, pulse 79, height 1.651 m (5' 5\"), weight 107 kg (235 lb 6.4 oz), SpO2 98%.     PHYSICAL EXAM  Physical Exam   General: Well-developed, well-nourished and in no acute distress.  Head: Normocephalic. Atraumatic.  Neck/thyroid: Neck is supple.   Eyes: Pupils equal round and reactive to light. Conjunctiva normal.  ENMT: No masses or deformity of external nose. External ears without masses.  Respiratory/Chest:  Normal respiratory effort.  Cardiovascular: Regular rate and rhythm.   Abdomen: Abdomen is slightly rounded secondary to body habitus.  There is a 3 cm mid epigastric diastases which does not involve the entire length of the epigastrium.  No tenderness in the area of the diastases.  Higher in the epigastric region just to the right of the midline, just underneath the costal margin, there is a pinpoint area of tenderness.  No palpable mass in this region with the patient examined both in the supine and standing position.  Musculoskeletal: Joints and limbs are grossly normal. Normal gait. Normal range of motion of major joints.  Neuro: Oriented to person, place and time. No obvious neurological deficit. Motor strength grossly normal.  Psych: Normal mood and affect.    RESULTS   Labs  No results found for this or any previous visit (from the past 24 hour(s)).    Radiology Resutls  CT abdomen pelvis wo IV contrast  Status: Final result     PACS Images     Show images for CT abdomen pelvis wo IV contrast  Signed by    Signed Time Phone Pager   Main Bauer MD 1/04/2024 19:03 [phone]     Exam " Information    Status Exam Begun Exam Ended   Final 1/04/2024 17:16 1/04/2024 17:27     Study Result    Narrative & Impression   Interpreted By:  Main Bauer,   STUDY:  CT ABDOMEN PELVIS WO IV CONTRAST;  1/4/2024 5:27 pm      INDICATION:  Signs/Symptoms:Acute left flank pain, status post ureteroscopy for  ureteral stone 01/12/2024.      COMPARISON:  12/21/2023      ACCESSION NUMBER(S):  [id number]      ORDERING CLINICIAN:  YARI BETTS      TECHNIQUE:  Contiguous axial images of the abdomen and pelvis were obtained  without intravenous contrast. Coronal and sagittal reformatted images  were reconstructed from the axial data.      FINDINGS:  LOWER CHEST: No acute abnormality.      Note: The absence of intravenous contrast limits evaluation of the  solid organs and vasculature.      ABDOMEN/PELVIS:      ABDOMINAL WALL: No significant abnormality.      LIVER: Diffuse hypoattenuation of the liver consistent with steatosis.      BILE DUCTS: No significant intrahepatic or extrahepatic dilatation.      GALLBLADDER: Surgically absent.      PANCREAS: No significant abnormality.      SPLEEN: No significant abnormality.      ADRENALS: No significant abnormality.      KIDNEYS, URETERS, BLADDER: The left humeral stone is no longer  present in the ureter or urinary bladder. There is mild left  perinephric stranding and hydroureteronephrosis, new from prior  study. There is a small amount of gas layering lateral lumen. The  urinary bladder wall thickness appears within normal limits for  degree of distention.      REPRODUCTIVE ORGANS: No significant abnormality.      VESSELS: No significant abnormality.      RETROPERITONEUM/LYMPH NODES: No enlarged lymph nodes. No acute  retroperitoneal abnormality.      BOWEL/MESENTERY/PERITONEUM:  No inflammatory bowel wall thickening or  dilatation. Normal appendix.      No ascites, free air, or fluid collection.          MUSCULOSKELETAL: No acute osseous abnormality.      IMPRESSION:  1.  New mild left hydroureteronephrosis and perinephric stranding  despite the left renal stone no longer being present. Findings,  therefore, raise the possibility of pyelonephritis.      2. Gas in the urinary bladder lumen that could be due to recent  instrumentation or cystitis.      3. Hepatic steatosis.      MACRO:  None.      Signed by: Main Bauer 1/4/2024 7:03 PM  Dictation workstation:   [id number]         ASSESSMENT / PLAN   Diagnoses and all orders for this visit:  Epigastric pain  -     Referral to General Surgery      Plan  1.  Reviewed with the patient that the pain that he is experiencing the upper epigastric region, is likely musculoskeletal.  He does not have any functional abdominal discomfort, and it only hurts when direct pressure is applied to the area.  There is no mass by physical exam or CT evaluation.  Recommended that he use local treatment to the area for any discomfort such as ice/Tylenol/ibuprofen.  Also recommended avoidance of trauma to that region.  2.  He may continue with all physical activity as tolerated.  3.  He is referred back to his primary care physician for all further medical care, but will be referred back to my office for any further surgical needs.      Salina Armijo MD, FACS  [facility] General Surgery  [address];   [address]  [place]  [address] [phone]

## 2024-10-01 NOTE — PROGRESS NOTES
GENERAL SURGERY OFFICE NOTE    Patient: Maicol Haley    Age: 44 y.o.   Gender: male    MRN: [id number]    PCP: Horacio Palomino MD        SUBJECTIVE     Chief Complaint  New Patient Visit (Patient was referred by Dr. Palomino for abdominal wall mass. Patient states that he has had the mass for a couple of years. Patient states that the mass has not changed in size. Patient states that he has pain with touch. )       ERNESTINE Milian is a 44-year-old white male who I am seeing in consultation at the request of his primary care physician for evaluation of epigastric abdominal pain.  He states that over the last several years, he has had intermittent pain in the high epigastric region just to the right of the midline.  When he is doing activities, or working out in the gym, he does not have any associated pain.  However, when direct pressure is applied to the area, or his wife accidentally hits him in the area with her elbow, there is pain in this region.  He believes there is a lump in this region, but that the lump is not changing or growing in the last several years.  He also notices some occasional right sided abdominal cramps.  He is able to eat and drink well, and has actually gained weight in the last few years.  He did have a CT evaluation of his abdomen/pelvis approximately 9 months ago when he had a kidney stone, and no abnormality of the abdominal wall was seen.    ROS  Review of Systems   Constitutional:  Negative for chills, fatigue and fever.   HENT:  Negative for congestion, ear pain and hearing loss.    Eyes:  Negative for pain and redness.   Respiratory:  Negative for cough and shortness of breath.    Cardiovascular:  Negative for chest pain and leg swelling.   Gastrointestinal:  Positive for abdominal pain. Negative for constipation, diarrhea, nausea and vomiting.   Endocrine: Negative for polyphagia.   Genitourinary:  Negative for dysuria, flank pain, frequency and hematuria.  "  Musculoskeletal:  Negative for arthralgias and myalgias.   Skin:  Negative for rash and wound.   Allergic/Immunologic: Negative for immunocompromised state.   Neurological:  Negative for speech difficulty, weakness and headaches.   Hematological:  Does not bruise/bleed easily.   Psychiatric/Behavioral:  Negative for agitation and confusion.           HISTORY     Past Medical History:   Diagnosis Date    Hypertension         Past Surgical History:   Procedure Laterality Date    KIDNEY STONE SURGERY      x2    OTHER SURGICAL HISTORY  2021    Cholecystectomy        Family History   Problem Relation Name Age of Onset    Diabetes Mother      Hypertension Mother      Esophageal cancer Father      Hypertension Maternal Grandfather      Other (Cardiac disorder) Other      Diabetes Other      Other (Malignant Neoplasm) Other      Other (Hypertension, Benign) Other          No Known Allergies     Social History     Tobacco Use   Smoking Status Former    Current packs/day: 0.00    Types: Cigarettes    Quit date:     Years since quittin.7   Smokeless Tobacco Never        Social History     Substance and Sexual Activity   Alcohol Use Yes    Alcohol/week: 1.0 standard drink of alcohol    Types: 1 Cans of beer per week        HOME MEDICATIONS  Current Outpatient Medications   Medication Instructions    cholecalciferol (VITAMIN D3) 5,000 Units, oral, Daily    clotrimazole-betamethasone (Lotrisone) cream Topical, 2 times daily, Apply and rub in a thin film to affected areas    HYDROcodone-acetaminophen (Norco) 5-325 mg tablet 1 tablet, oral, Every 6 hours PRN    lisinopril 10 mg, oral, Daily          OBJECTIVE   Last Recorded Vitals.  Blood pressure (!) 152/115, pulse 79, height 1.651 m (5' 5\"), weight 107 kg (235 lb 6.4 oz), SpO2 98%.     PHYSICAL EXAM  Physical Exam   General: Well-developed, well-nourished and in no acute distress.  Head: Normocephalic. Atraumatic.  Neck/thyroid: Neck is supple.   Eyes: Pupils " equal round and reactive to light. Conjunctiva normal.  ENMT: No masses or deformity of external nose. External ears without masses.  Respiratory/Chest:  Normal respiratory effort.  Cardiovascular: Regular rate and rhythm.   Abdomen: Abdomen is slightly rounded secondary to body habitus.  There is a 3 cm mid epigastric diastases which does not involve the entire length of the epigastrium.  No tenderness in the area of the diastases.  Higher in the epigastric region just to the right of the midline, just underneath the costal margin, there is a pinpoint area of tenderness.  No palpable mass in this region with the patient examined both in the supine and standing position.  Musculoskeletal: Joints and limbs are grossly normal. Normal gait. Normal range of motion of major joints.  Neuro: Oriented to person, place and time. No obvious neurological deficit. Motor strength grossly normal.  Psych: Normal mood and affect.    RESULTS   Labs  No results found for this or any previous visit (from the past 24 hour(s)).    Radiology Resutls  CT abdomen pelvis wo IV contrast  Status: Final result     PACS Images     Show images for CT abdomen pelvis wo IV contrast  Signed by    Signed Time Phone Pager   Main Bauer MD 1/04/2024 19:03 [phone]     Exam Information    Status Exam Begun Exam Ended   Final 1/04/2024 17:16 1/04/2024 17:27     Study Result    Narrative & Impression   Interpreted By:  Main Bauer,   STUDY:  CT ABDOMEN PELVIS WO IV CONTRAST;  1/4/2024 5:27 pm      INDICATION:  Signs/Symptoms:Acute left flank pain, status post ureteroscopy for  ureteral stone 01/12/2024.      COMPARISON:  12/21/2023      ACCESSION NUMBER(S):  [id number]      ORDERING CLINICIAN:  YARI BETTS      TECHNIQUE:  Contiguous axial images of the abdomen and pelvis were obtained  without intravenous contrast. Coronal and sagittal reformatted images  were reconstructed from the axial data.      FINDINGS:  LOWER CHEST: No  acute abnormality.      Note: The absence of intravenous contrast limits evaluation of the  solid organs and vasculature.      ABDOMEN/PELVIS:      ABDOMINAL WALL: No significant abnormality.      LIVER: Diffuse hypoattenuation of the liver consistent with steatosis.      BILE DUCTS: No significant intrahepatic or extrahepatic dilatation.      GALLBLADDER: Surgically absent.      PANCREAS: No significant abnormality.      SPLEEN: No significant abnormality.      ADRENALS: No significant abnormality.      KIDNEYS, URETERS, BLADDER: The left humeral stone is no longer  present in the ureter or urinary bladder. There is mild left  perinephric stranding and hydroureteronephrosis, new from prior  study. There is a small amount of gas layering lateral lumen. The  urinary bladder wall thickness appears within normal limits for  degree of distention.      REPRODUCTIVE ORGANS: No significant abnormality.      VESSELS: No significant abnormality.      RETROPERITONEUM/LYMPH NODES: No enlarged lymph nodes. No acute  retroperitoneal abnormality.      BOWEL/MESENTERY/PERITONEUM:  No inflammatory bowel wall thickening or  dilatation. Normal appendix.      No ascites, free air, or fluid collection.          MUSCULOSKELETAL: No acute osseous abnormality.      IMPRESSION:  1. New mild left hydroureteronephrosis and perinephric stranding  despite the left renal stone no longer being present. Findings,  therefore, raise the possibility of pyelonephritis.      2. Gas in the urinary bladder lumen that could be due to recent  instrumentation or cystitis.      3. Hepatic steatosis.      MACRO:  None.      Signed by: Main Bauer 1/4/2024 7:03 PM  Dictation workstation:   [id number]         ASSESSMENT / PLAN   Diagnoses and all orders for this visit:  Epigastric pain  -     Referral to General Surgery      Plan  1.  Reviewed with the patient that the pain that he is experiencing the upper epigastric region, is likely musculoskeletal.   He does not have any functional abdominal discomfort, and it only hurts when direct pressure is applied to the area.  There is no mass by physical exam or CT evaluation.  Recommended that he use local treatment to the area for any discomfort such as ice/Tylenol/ibuprofen.  Also recommended avoidance of trauma to that region.  2.  He may continue with all physical activity as tolerated.  3.  He is referred back to his primary care physician for all further medical care, but will be referred back to my office for any further surgical needs.      Salina Armijo MD, FACS  [facility] General Surgery  [address] [place] [address];   [address]  [place]  [address] [phone]

## 2024-10-08 ENCOUNTER — OFFICE VISIT (OUTPATIENT)
Dept: URGENT CARE | Age: 44
End: 2024-10-08
Payer: COMMERCIAL

## 2024-10-08 VITALS
OXYGEN SATURATION: 98 % | RESPIRATION RATE: 18 BRPM | TEMPERATURE: 97.2 F | DIASTOLIC BLOOD PRESSURE: 92 MMHG | SYSTOLIC BLOOD PRESSURE: 144 MMHG | HEART RATE: 70 BPM

## 2024-10-08 DIAGNOSIS — J40 BRONCHITIS: Primary | ICD-10-CM

## 2024-10-08 PROCEDURE — 99203 OFFICE O/P NEW LOW 30 MIN: CPT

## 2024-10-08 PROCEDURE — 3077F SYST BP >= 140 MM HG: CPT

## 2024-10-08 PROCEDURE — 3080F DIAST BP >= 90 MM HG: CPT

## 2024-10-08 RX ORDER — BENZONATATE 200 MG/1
200 CAPSULE ORAL 3 TIMES DAILY PRN
Qty: 20 CAPSULE | Refills: 0 | Status: SHIPPED | OUTPATIENT
Start: 2024-10-08 | End: 2024-10-15

## 2024-10-08 RX ORDER — AZITHROMYCIN 250 MG/1
TABLET, FILM COATED ORAL
Qty: 6 TABLET | Refills: 0 | Status: SHIPPED | OUTPATIENT
Start: 2024-10-08 | End: 2024-10-13

## 2024-10-08 ASSESSMENT — ENCOUNTER SYMPTOMS
FEVER: 0
SORE THROAT: 1
PALPITATIONS: 0
WHEEZING: 0
SHORTNESS OF BREATH: 0
NAUSEA: 0
RHINORRHEA: 0
COUGH: 1
CHEST TIGHTNESS: 0
DIARRHEA: 0
CHILLS: 0
TROUBLE SWALLOWING: 0
CONSTIPATION: 0

## 2024-10-08 NOTE — PROGRESS NOTES
Subjective   Patient ID: Maicol Haley is a 44 y.o. male. They present today with a chief complaint of Cough (Pt advised that he just returned from an [place] cruise 2 weeks prior. Since then he has had a productive cough, chest congestion (worse laying supine), and Rhinorrhea).    History of Present Illness  Patient presents with 2 weeks of productive cough, congestion, chest congestion, sore throat from forceful coughing fits. Denies fever, chills, sweats. Denies n/v/d. Denies chest pain, sob. Denies history of asthma, copd.       Cough  Associated symptoms include postnasal drip and a sore throat. Pertinent negatives include no chest pain, chills, ear pain, fever, rash, rhinorrhea, shortness of breath or wheezing.       Past Medical History  Allergies as of 10/08/2024    (No Known Allergies)       (Not in a hospital admission)       Past Medical History:   Diagnosis Date    Hypertension        Past Surgical History:   Procedure Laterality Date    KIDNEY STONE SURGERY      x2    OTHER SURGICAL HISTORY  06/03/2021    Cholecystectomy        reports that he quit smoking about 11 years ago. His smoking use included cigarettes. He has never used smokeless tobacco. He reports current alcohol use of about 1.0 standard drink of alcohol per week. He reports that he does not use drugs.    Review of Systems  Review of Systems   Constitutional:  Negative for chills and fever.   HENT:  Positive for congestion, postnasal drip and sore throat. Negative for ear pain, rhinorrhea and trouble swallowing.    Respiratory:  Positive for cough. Negative for chest tightness, shortness of breath and wheezing.    Cardiovascular:  Negative for chest pain and palpitations.   Gastrointestinal:  Negative for constipation, diarrhea and nausea.   Skin:  Negative for rash.                                  Objective    Vitals:    10/08/24 1136   BP: (!) 144/92   Pulse: 70   Resp: 18   Temp: 36.2 °C (97.2 °F)   SpO2: 98%     No  LMP for male patient.    Physical Exam  Constitutional:       General: He is not in acute distress.     Appearance: He is not toxic-appearing.   HENT:      Right Ear: Tympanic membrane and external ear normal.      Left Ear: Tympanic membrane and external ear normal.      Nose: Congestion present.      Right Sinus: No frontal sinus tenderness.      Left Sinus: No frontal sinus tenderness.      Mouth/Throat:      Mouth: Mucous membranes are moist. No oral lesions.      Pharynx: Postnasal drip present. No oropharyngeal exudate or posterior oropharyngeal erythema.   Eyes:      Pupils: Pupils are equal, round, and reactive to light.   Cardiovascular:      Rate and Rhythm: Normal rate and regular rhythm.      Heart sounds: Normal heart sounds.   Pulmonary:      Effort: Pulmonary effort is normal. No respiratory distress.      Breath sounds: Normal breath sounds. No wheezing.   Musculoskeletal:      Cervical back: Normal range of motion.   Neurological:      Mental Status: He is alert.         Procedures    Point of Care Test & Imaging Results from this visit  No results found for this visit on 10/08/24.   No results found.    Diagnostic study results (if any) were reviewed by Neli Michaud PA-C.    Assessment/Plan   Allergies, medications, history, and pertinent labs/EKGs/Imaging reviewed by Neli Michaud PA-C.     Medical Decision Making  MDM- History and exam consistent with acute cough ddx includes bronchitis. No evidence of pneumonia, sepsis or other acute cardiopulmonary pathology. Based on current exam I don't feel that imaging, labs, or further work up are warranted at this point. Based on current exam and past medical history, plan is for antibiotics and symptomatic therapies. Patient advised to return to clinic or go to the ED if symptoms change or worsen. Patient verbalized understanding and agrees with plan.      Orders and Diagnoses  Diagnoses and all orders for this visit:  Bronchitis  -      azithromycin (Zithromax) 250 mg tablet; Take 2 tabs (500 mg) by mouth today, than 1 daily for 4 days.  -     benzonatate (Tessalon) 200 mg capsule; Take 1 capsule (200 mg) by mouth 3 times a day as needed for cough for up to 7 days. Do not crush or chew.      Medical Admin Record      Patient disposition: Home    Electronically signed by Neli Michaud PA-C  11:52 AM

## 2024-11-26 ENCOUNTER — APPOINTMENT (OUTPATIENT)
Dept: PRIMARY CARE | Facility: CLINIC | Age: 44
End: 2024-11-26
Payer: COMMERCIAL

## 2025-03-17 ENCOUNTER — TELEPHONE (OUTPATIENT)
Dept: PRIMARY CARE | Facility: CLINIC | Age: 45
End: 2025-03-17
Payer: COMMERCIAL

## 2025-03-17 DIAGNOSIS — I10 BENIGN ESSENTIAL HYPERTENSION: ICD-10-CM

## 2025-03-17 RX ORDER — LISINOPRIL 10 MG/1
10 TABLET ORAL DAILY
Qty: 90 TABLET | Refills: 1 | Status: SHIPPED | OUTPATIENT
Start: 2025-03-17 | End: 2025-09-13

## 2025-03-17 NOTE — TELEPHONE ENCOUNTER
Patient is requesting a refill on:    Lisinopril 10 mg tablet  Take 1 tablet (10 mg) by mouth once daily.    Pharmacy: [facility]     Last office visit: 9/17/2024  Next office visit: 4/8/2025

## 2025-04-04 LAB
25(OH)D3+25(OH)D2 SERPL-MCNC: 27 NG/ML (ref 30–100)
ALBUMIN SERPL-MCNC: 4.1 G/DL (ref 3.6–5.1)
ALP SERPL-CCNC: 74 U/L (ref 36–130)
ALT SERPL-CCNC: 19 U/L (ref 9–46)
ANION GAP SERPL CALCULATED.4IONS-SCNC: 7 MMOL/L (CALC) (ref 7–17)
AST SERPL-CCNC: 17 U/L (ref 10–40)
BILIRUB SERPL-MCNC: 0.4 MG/DL (ref 0.2–1.2)
BUN SERPL-MCNC: 14 MG/DL (ref 7–25)
CALCIUM SERPL-MCNC: 8.8 MG/DL (ref 8.6–10.3)
CHLORIDE SERPL-SCNC: 103 MMOL/L (ref 98–110)
CHOLEST SERPL-MCNC: 148 MG/DL
CHOLEST/HDLC SERPL: 3.7 (CALC)
CO2 SERPL-SCNC: 29 MMOL/L (ref 20–32)
CREAT SERPL-MCNC: 0.97 MG/DL (ref 0.6–1.29)
EGFRCR SERPLBLD CKD-EPI 2021: 99 ML/MIN/1.73M2
ERYTHROCYTE [DISTWIDTH] IN BLOOD BY AUTOMATED COUNT: 12.9 % (ref 11–15)
GLUCOSE SERPL-MCNC: 91 MG/DL (ref 65–99)
HCT VFR BLD AUTO: 49.2 % (ref 38.5–50)
HDLC SERPL-MCNC: 40 MG/DL
HGB BLD-MCNC: 16.1 G/DL (ref 13.2–17.1)
LDLC SERPL CALC-MCNC: 84 MG/DL (CALC)
MCH RBC QN AUTO: 28.9 PG (ref 27–33)
MCHC RBC AUTO-ENTMCNC: 32.7 G/DL (ref 32–36)
MCV RBC AUTO: 88.3 FL (ref 80–100)
NONHDLC SERPL-MCNC: 108 MG/DL (CALC)
PLATELET # BLD AUTO: 279 THOUSAND/UL (ref 140–400)
PMV BLD REES-ECKER: 10.3 FL (ref 7.5–12.5)
POTASSIUM SERPL-SCNC: 4.4 MMOL/L (ref 3.5–5.3)
PROT SERPL-MCNC: 6.7 G/DL (ref 6.1–8.1)
RBC # BLD AUTO: 5.57 MILLION/UL (ref 4.2–5.8)
SODIUM SERPL-SCNC: 139 MMOL/L (ref 135–146)
TRIGL SERPL-MCNC: 143 MG/DL
TSH SERPL-ACNC: 1.29 MIU/L (ref 0.4–4.5)
WBC # BLD AUTO: 6.9 THOUSAND/UL (ref 3.8–10.8)

## 2025-04-08 ENCOUNTER — APPOINTMENT (OUTPATIENT)
Dept: PRIMARY CARE | Facility: CLINIC | Age: 45
End: 2025-04-08
Payer: COMMERCIAL

## 2025-04-08 VITALS
HEART RATE: 73 BPM | BODY MASS INDEX: 38.94 KG/M2 | SYSTOLIC BLOOD PRESSURE: 131 MMHG | RESPIRATION RATE: 14 BRPM | WEIGHT: 234 LBS | DIASTOLIC BLOOD PRESSURE: 84 MMHG | OXYGEN SATURATION: 95 % | TEMPERATURE: 97.1 F

## 2025-04-08 DIAGNOSIS — I10 BENIGN ESSENTIAL HYPERTENSION: Primary | ICD-10-CM

## 2025-04-08 DIAGNOSIS — G47.33 OSA (OBSTRUCTIVE SLEEP APNEA): ICD-10-CM

## 2025-04-08 DIAGNOSIS — E55.9 VITAMIN D DEFICIENCY: ICD-10-CM

## 2025-04-08 DIAGNOSIS — E66.01 SEVERE OBESITY (BMI 35.0-39.9) WITH COMORBIDITY (MULTI): ICD-10-CM

## 2025-04-08 PROBLEM — R19.06 ABDOMINAL WALL MASS OF EPIGASTRIC REGION: Status: RESOLVED | Noted: 2024-09-17 | Resolved: 2025-04-08

## 2025-04-08 PROBLEM — L98.9 SKIN LESION: Status: RESOLVED | Noted: 2024-09-17 | Resolved: 2025-04-08

## 2025-04-08 PROCEDURE — 1036F TOBACCO NON-USER: CPT | Performed by: FAMILY MEDICINE

## 2025-04-08 PROCEDURE — 99213 OFFICE O/P EST LOW 20 MIN: CPT | Performed by: FAMILY MEDICINE

## 2025-04-08 PROCEDURE — 3079F DIAST BP 80-89 MM HG: CPT | Performed by: FAMILY MEDICINE

## 2025-04-08 PROCEDURE — 3075F SYST BP GE 130 - 139MM HG: CPT | Performed by: FAMILY MEDICINE

## 2025-04-08 RX ORDER — LISINOPRIL 10 MG/1
10 TABLET ORAL DAILY
Qty: 90 TABLET | Refills: 1 | Status: SHIPPED | OUTPATIENT
Start: 2025-04-08 | End: 2025-10-05

## 2025-04-08 ASSESSMENT — PATIENT HEALTH QUESTIONNAIRE - PHQ9
1. LITTLE INTEREST OR PLEASURE IN DOING THINGS: NOT AT ALL
SUM OF ALL RESPONSES TO PHQ9 QUESTIONS 1 AND 2: 0
2. FEELING DOWN, DEPRESSED OR HOPELESS: NOT AT ALL

## 2025-04-08 ASSESSMENT — ENCOUNTER SYMPTOMS
CHEST TIGHTNESS: 0
CHILLS: 0
ABDOMINAL PAIN: 0
FEVER: 0
PALPITATIONS: 0
CONFUSION: 0
SHORTNESS OF BREATH: 0
ARTHRALGIAS: 0

## 2025-04-08 NOTE — PROGRESS NOTES
Subjective   Patient ID: Maicol Haley is a 44 y.o. male who presents for Follow-up.    HPI patient today for follow-up of ongoing healthcare issues and review of blood work.  He admits he has not been taking vitamin D supplement.  Or has he scheduled his sleep study.  He did see his surgical specialist with regards to his abdominal symptoms they did not feel anything acutely was going on nor did they feel he had any sort of abdominal mass.    Review of Systems   Constitutional:  Negative for chills and fever.   HENT:  Negative for congestion and ear pain.    Eyes:  Negative for visual disturbance.   Respiratory:  Negative for chest tightness and shortness of breath.    Cardiovascular:  Negative for chest pain and palpitations.   Gastrointestinal:  Negative for abdominal pain.   Musculoskeletal:  Negative for arthralgias.   Skin:  Negative for pallor.   Psychiatric/Behavioral:  Negative for confusion.        Objective   /84   Pulse 73   Temp 36.2 °C (97.1 °F)   Resp 14   Wt 106 kg (234 lb)   SpO2 95%   BMI 38.94 kg/m²     Physical Exam  Vitals and nursing note reviewed.   Constitutional:       General: He is not in acute distress.     Appearance: Normal appearance. He is not ill-appearing.   HENT:      Head: Normocephalic and atraumatic.      Right Ear: Tympanic membrane, ear canal and external ear normal.      Left Ear: Tympanic membrane, ear canal and external ear normal.      Mouth/Throat:      Pharynx: Oropharynx is clear.   Eyes:      Extraocular Movements: Extraocular movements intact.   Cardiovascular:      Rate and Rhythm: Normal rate and regular rhythm.      Pulses: Normal pulses.      Heart sounds: Normal heart sounds.   Pulmonary:      Effort: Pulmonary effort is normal.      Breath sounds: Normal breath sounds.   Abdominal:      General: Abdomen is flat. Bowel sounds are normal.      Palpations: Abdomen is soft.      Tenderness: There is no abdominal tenderness.    Musculoskeletal:         General: Normal range of motion.      Cervical back: Neck supple.   Skin:     General: Skin is warm.   Neurological:      Mental Status: He is alert and oriented to person, place, and time. Mental status is at baseline.   Psychiatric:         Mood and Affect: Mood normal.       Recent Results (from the past 6 weeks)   Lipid Panel    Collection Time: 04/03/25  9:40 AM   Result Value Ref Range    CHOLESTEROL, TOTAL 148 <200 mg/dL    HDL CHOLESTEROL 40 > OR = 40 mg/dL    TRIGLYCERIDES 143 <150 mg/dL    LDL-CHOLESTEROL 84 mg/dL (calc)    CHOL/HDLC RATIO 3.7 <5.0 (calc)    NON HDL CHOLESTEROL 108 <130 mg/dL (calc)   Comprehensive Metabolic Panel    Collection Time: 04/03/25  9:40 AM   Result Value Ref Range    GLUCOSE 91 65 - 99 mg/dL    UREA NITROGEN (BUN) 14 7 - 25 mg/dL    CREATININE 0.97 0.60 - 1.29 mg/dL    EGFR 99 > OR = 60 mL/min/1.73m2    SODIUM 139 135 - 146 mmol/L    POTASSIUM 4.4 3.5 - 5.3 mmol/L    CHLORIDE 103 98 - 110 mmol/L    CARBON DIOXIDE 29 20 - 32 mmol/L    ELECTROLYTE BALANCE 7 7 - 17 mmol/L (calc)    CALCIUM 8.8 8.6 - 10.3 mg/dL    PROTEIN, TOTAL 6.7 6.1 - 8.1 g/dL    ALBUMIN 4.1 3.6 - 5.1 g/dL    BILIRUBIN, TOTAL 0.4 0.2 - 1.2 mg/dL    ALKALINE PHOSPHATASE 74 36 - 130 U/L    AST 17 10 - 40 U/L    ALT 19 9 - 46 U/L   CBC    Collection Time: 04/03/25  9:40 AM   Result Value Ref Range    WHITE BLOOD CELL COUNT 6.9 3.8 - 10.8 Thousand/uL    RED BLOOD CELL COUNT 5.57 4.20 - 5.80 Million/uL    HEMOGLOBIN 16.1 13.2 - 17.1 g/dL    HEMATOCRIT 49.2 38.5 - 50.0 %    MCV 88.3 80.0 - 100.0 fL    MCH 28.9 27.0 - 33.0 pg    MCHC 32.7 32.0 - 36.0 g/dL    RDW 12.9 11.0 - 15.0 %    PLATELET COUNT 279 140 - 400 Thousand/uL    MPV 10.3 7.5 - 12.5 fL   TSH with reflex to Free T4 if abnormal    Collection Time: 04/03/25  9:40 AM   Result Value Ref Range    TSH W/REFLEX TO FT4 1.29 0.40 - 4.50 mIU/L   Vitamin D 25-Hydroxy,Total (for eval of Vitamin D levels)    Collection Time: 04/03/25  9:40  AM   Result Value Ref Range    VITAMIN D,25-OH,TOTAL,IA 27 (L) 30 - 100 ng/mL     Recent labs reviewed overall numbers are stable except for vitamin D deficiency he is to start back on vitamin D3 5000 units daily    Continue lisinopril 10 mg daily refill provided    Information provided for him to schedule sleep study  Return to office 4 months with repeat blood work      Assessment/Plan   Problem List Items Addressed This Visit             ICD-10-CM    Benign essential hypertension - Primary I10     Stable continue lisinopril 10 mg daily refill provided         Relevant Medications    lisinopril 10 mg tablet    Other Relevant Orders    Comprehensive Metabolic Panel    Follow Up In Primary Care - Established    Vitamin D deficiency E55.9     Start on vitamin D3 5000 units daily which she failed to do so.         Relevant Orders    Vitamin D 25-Hydroxy,Total (for eval of Vitamin D levels)    Comprehensive Metabolic Panel    Follow Up In Primary Care - Established    CARA (obstructive sleep apnea) G47.33     Patient to schedule sleep study which he failed to do so following his last appointment.         Severe obesity (BMI 35.0-39.9) with comorbidity (Multi) E66.01     Dietary modification exercise and weight loss strategies.

## 2025-04-18 ENCOUNTER — TELEPHONE (OUTPATIENT)
Dept: UROLOGY | Facility: CLINIC | Age: 45
End: 2025-04-18

## 2025-04-18 ENCOUNTER — OFFICE VISIT (OUTPATIENT)
Dept: UROLOGY | Facility: CLINIC | Age: 45
End: 2025-04-18
Payer: COMMERCIAL

## 2025-04-18 DIAGNOSIS — N20.1 URETERAL STONE: ICD-10-CM

## 2025-04-18 DIAGNOSIS — N20.0 KIDNEY STONES: Primary | ICD-10-CM

## 2025-04-18 DIAGNOSIS — R39.9 URINARY SYMPTOM OR SIGN: ICD-10-CM

## 2025-04-18 LAB
POC BILIRUBIN, URINE: NEGATIVE
POC BLOOD, URINE: NEGATIVE
POC GLUCOSE, URINE: NEGATIVE MG/DL
POC KETONES, URINE: NEGATIVE MG/DL
POC LEUKOCYTES, URINE: NEGATIVE
POC NITRITE,URINE: NEGATIVE
POC PH, URINE: 6 PH
POC PROTEIN, URINE: NEGATIVE MG/DL
POC SPECIFIC GRAVITY, URINE: 1.01
POC UROBILINOGEN, URINE: 0.2 EU/DL

## 2025-04-18 PROCEDURE — 81003 URINALYSIS AUTO W/O SCOPE: CPT | Performed by: UROLOGY

## 2025-04-18 PROCEDURE — 99213 OFFICE O/P EST LOW 20 MIN: CPT | Performed by: UROLOGY

## 2025-04-18 PROCEDURE — 1036F TOBACCO NON-USER: CPT | Performed by: UROLOGY

## 2025-04-18 NOTE — PATIENT INSTRUCTIONS
Impression  Right flank pain  Kidney stone    Plan  CT abdomen and pelvis without contrast for right flank pain history of kidney stone  Appointment in week

## 2025-04-18 NOTE — PROGRESS NOTES
04/18/2025  Acute onset right flank pain, history of kidney stone, possible muscle sprain    We discussed flank pain, CT abdomen pelvis without contrast  We discussed muscle sprain  All the questions were answered, the patient expressed understanding and agreed to the plan.    Impression  Right flank pain  Kidney stone    Plan  CT abdomen and pelvis without contrast for right flank pain history of kidney stone  Appointment in week      Chief Complaint   Patient presents with    Nephrolithiasis     Pt is here today for possible kidney stones. He states that he has pain all over his sides and back, he has a hx of stones and is unsure if he has them now.          Physical Exam     TODAYS LAB RESULTS:  POCT UA Automated manually resulted  Order: [id number]   Status: Final result       Next appt: 05/10/2025 at 08:00 PM in Sleep Lab (SLEEP LAB [facility] ROOM 1)       Dx: Urinary symptom or sign    Test Result Released: Yes (not seen)    0 Result Notes       Component  Ref Range & Units 13:47 1 yr ago   POC Glucose, Urine  NEGATIVE mg/dl NEGATIVE NEGATIVE   POC Bilirubin, Urine  NEGATIVE NEGATIVE NEGATIVE   POC Ketones, Urine  NEGATIVE mg/dl NEGATIVE NEGATIVE   POC Specific Gravity, Urine  1.005 - 1.035 1.010 1.015   POC Blood, Urine  NEGATIVE NEGATIVE TRACE-Intact Abnormal    POC PH, Urine  No Reference Range Established PH 6.0 6.0   POC Protein, Urine  NEGATIVE mg/dl NEGATIVE NEGATIVE R   POC Urobilinogen, Urine  0.2, 1.0 EU/DL 0.2 0.2   Poc Nitrite, Urine  NEGATIVE NEGATIVE NEGATIVE   POC Leukocytes, Urine  NEGATIVE NEGATIVE NEGATIVE             Specimen Collected: 04/18/25 13:47 Last Resulted: 04/18/25 13:47       ASSESSMENT&PLAN:      IMPRESSIONS:         01/19/2024  Cystoscopy left stent removal     Patient doing well after left stent insertion     A cystoscopy and left stent removal was performed     Pain evaluation: 0/10 before, 2/10 after.     We discussed kidney stone prevention kidney stone diet  All the  questions were answered, the patient expressed understanding and agreed to the plan.     Impression  Left flank pain  Left ureteral stone     Plan:  Stone education  Follow-up as needed             Chief Complaint   Patient presents with    Stent removal        Patient states here for stent removal .             Physical Exam      TODAYS LAB RESULTS:  No Urine Sample      ASSESSMENT&PLAN:        IMPRESSIONS:      12/28/2023  Intermittent left flank pain for a week     CT scan demonstrated a 3 mm left mid ureteral stone        I talked to patient about options for the kidney stones, #1 watchful waiting, #2 surgical intervention, cystoscopy ureteroscopy holmium laser lithotripsy and stent insertion. All questions were answered, patient expressed understanding and agreed to the plan.     Impression  Left flank pain  Left ureteral stone     Plan:  Cystoscopy ureteroscopy holmium laser lithotripsy and left stent insertion;  Cipro 500 mg twice a day x7 days;  Norco 5/325 one to 2 tablets every 4-6 hours x30;  Flomax 0.4 mg daily at bedtime x7;(patient has medication at home)  Return to office 2 weeks after surgery.             Chief Complaint   Patient presents with    Nephrolithiasis       Patient is here today for left flank pain x 1 week.  Patient went to the ER.         Physical Exam      TODAYS LAB RESULTS:  === 12/20/23 ===     CT ABDOMEN PELVIS WO IV CONTRAST     - Impression -  1. 0.3 cm calculus within the left mid ureter contributing to very  mild obstruction with mild left perinephric fat stranding and slight  prominence of the left proximal ureter. No hydronephrosis. No  additional urinary calculi.     Signed by: Roman Rehman 12/21/2023 1:17 AM  Dictation workstation:   [id number]      Component  Ref Range & Units 13:17   POC Glucose, Urine  NEGATIVE mg/dl NEGATIVE   POC Bilirubin, Urine  NEGATIVE NEGATIVE   POC Ketones, Urine  NEGATIVE mg/dl NEGATIVE   POC Specific Gravity, Urine  1.005 - 1.035 1.015    POC Blood, Urine  NEGATIVE TRACE-Intact Abnormal    POC PH, Urine  No Reference Range Established PH 6.0   POC Protein, Urine  NEGATIVE, 30 (1+) mg/dl NEGATIVE   POC Urobilinogen, Urine  0.2, 1.0 EU/DL 0.2   Poc Nitrite, Urine  NEGATIVE NEGATIVE   POC Leukocytes, Urine  NEGATIVE NEGATIVE            ASSESSMENT&PLAN:        IMPRESSIONS:       06/03/2021  40-year-old gentleman developed acute onset of right flank pain, he has had visited the ER twice for small right distal ureteral stone. Currently he has 4/10 pain.     Patient has no nausea, no vomiting, no fever.     IO UA (automated w/o microscopy)           03Jun2021 09:37AM          Benitez Grimes     Test Name       Result     Flag        Reference  IO Glucose - Urine         Negative                  IO Bilirubin       Negative                  IO Ketones      Negative                  IO Specific Gravity         1.030                       IO Blood          Negative                  IO pH               6.5                           IO Protein, Urine            Negative                  IO Urobilinogen              1 mg/dl                    IO Nitrite, Urine              Negative                  IO Leukocytes Negative                  IO Glucose - Urine         Negative                  IO Bilirubin       Negative                  IO Ketones      Negative                  IO Specific Gravity         1.030                       IO Blood          Negative                  IO pH               6.5                           IO Protein, Urine            Negative                  IO Urobilinogen              1 mg/dl                    IO Nitrite, Urine              Negative                  IO Leukocytes Negative                                                            CT abdomen and pelvis without contrast  IMPRESSION:  1. 2.5 mm stone distal right ureter without hydronephrosis.     CT scan abdomen and pelvis resolved demonstrates     I talked to patient about  options for the kidney stones, #1 watchful waiting, #2 surgical intervention, cystoscopy ureteroscopy holmium laser lithotripsy and stent insertion. All questions were answered, patient expressed understanding and agreed to the plan.     Impression  Right flank pain  Right ureteral stone     Plan:  Cystoscopy ureteroscopy holmium laser lithotripsy and right stent insertion;  Return to office 2 weeks after surgery.        Chief Complaint     New Patient here for ER follow up from 5/26/21 and 5/29/21. CT done 5/26/21: IMPRESSION:  1. 2.5 mm stone distal right ureter without hydronephrosis.      History of Present Illness  On a scale of 0 to 10, the patient rates the pain at 8.   Pain Location: left flank.   Pain Quality: Stabbing.         06/03/2021  40-year-old gentleman developed acute onset of right flank pain, he has had visited the ER twice for small right distal ureteral stone. Currently he has 4/10 pain.     Patient has no nausea, no vomiting, no fever.     IO UA (automated w/o microscopy)           [id number] 09:37AM          Benitez Grimes     Test Name       Result     Flag        Reference  IO Glucose - Urine         Negative                  IO Bilirubin       Negative                  IO Ketones      Negative                  IO Specific Gravity         1.030                       IO Blood          Negative                  IO pH               6.5                           IO Protein, Urine            Negative                  IO Urobilinogen              1 mg/dl                    IO Nitrite, Urine              Negative                  IO Leukocytes Negative                  IO Glucose - Urine         Negative                  IO Bilirubin       Negative                  IO Ketones      Negative                  IO Specific Gravity         1.030                       IO Blood          Negative                  IO pH               6.5                           IO Protein, Urine            Negative                   IO Urobilinogen              1 mg/dl                    IO Nitrite, Urine              Negative                  IO Leukocytes Negative                                                            CT abdomen and pelvis without contrast  IMPRESSION:  1. 2.5 mm stone distal right ureter without hydronephrosis.     CT scan abdomen and pelvis resolved demonstrates     I talked to patient about options for the kidney stones, #1 watchful waiting, #2 surgical intervention, cystoscopy ureteroscopy holmium laser lithotripsy and stent insertion. All questions were answered, patient expressed understanding and agreed to the plan.     Impression  Right flank pain  Right ureteral stone     Plan:  Cystoscopy ureteroscopy holmium laser lithotripsy and right stent insertion;  Return to office 2 weeks after surgery.        Surgery  1/5/2024 cystoscopy left ureteral stent insertion  1/2/2024 cystoscopy retrograde pyelogram left ureteroscopy

## 2025-04-25 ENCOUNTER — APPOINTMENT (OUTPATIENT)
Dept: UROLOGY | Facility: CLINIC | Age: 45
End: 2025-04-25
Payer: COMMERCIAL

## 2025-05-05 ENCOUNTER — APPOINTMENT (OUTPATIENT)
Dept: RADIOLOGY | Facility: CLINIC | Age: 45
End: 2025-05-05
Payer: COMMERCIAL

## 2025-05-10 ENCOUNTER — APPOINTMENT (OUTPATIENT)
Dept: SLEEP MEDICINE | Facility: CLINIC | Age: 45
End: 2025-05-10
Payer: COMMERCIAL

## 2025-05-17 ENCOUNTER — CLINICAL SUPPORT (OUTPATIENT)
Dept: SLEEP MEDICINE | Facility: CLINIC | Age: 45
End: 2025-05-17
Payer: COMMERCIAL

## 2025-05-17 VITALS
SYSTOLIC BLOOD PRESSURE: 139 MMHG | DIASTOLIC BLOOD PRESSURE: 89 MMHG | HEIGHT: 65 IN | WEIGHT: 235.89 LBS | BODY MASS INDEX: 39.3 KG/M2

## 2025-05-17 DIAGNOSIS — G47.33 OSA (OBSTRUCTIVE SLEEP APNEA): ICD-10-CM

## 2025-05-17 ASSESSMENT — SLEEP AND FATIGUE QUESTIONNAIRES
HOW LIKELY ARE YOU TO NOD OFF OR FALL ASLEEP IN A CAR, WHILE STOPPED FOR A FEW MINUTES IN TRAFFIC: WOULD NEVER DOZE
HOW LIKELY ARE YOU TO NOD OFF OR FALL ASLEEP WHEN YOU ARE A PASSENGER IN A CAR FOR AN HOUR WITHOUT A BREAK: MODERATE CHANCE OF DOZING
SITING INACTIVE IN A PUBLIC PLACE LIKE A CLASS ROOM OR A MOVIE THEATER: SLIGHT CHANCE OF DOZING
HOW LIKELY ARE YOU TO NOD OFF OR FALL ASLEEP WHILE LYING DOWN TO REST IN THE AFTERNOON WHEN CIRCUMSTANCES PERMIT: SLIGHT CHANCE OF DOZING
ESS-CHAD TOTAL SCORE: 6
HOW LIKELY ARE YOU TO NOD OFF OR FALL ASLEEP WHILE SITTING AND READING: SLIGHT CHANCE OF DOZING
HOW LIKELY ARE YOU TO NOD OFF OR FALL ASLEEP WHILE SITTING AND TALKING TO SOMEONE: WOULD NEVER DOZE
HOW LIKELY ARE YOU TO NOD OFF OR FALL ASLEEP WHILE SITTING QUIETLY AFTER LUNCH WITHOUT ALCOHOL: WOULD NEVER DOZE
HOW LIKELY ARE YOU TO NOD OFF OR FALL ASLEEP WHILE WATCHING TV: SLIGHT CHANCE OF DOZING

## 2025-07-08 DIAGNOSIS — E55.9 VITAMIN D DEFICIENCY: ICD-10-CM

## 2025-07-08 DIAGNOSIS — I10 BENIGN ESSENTIAL HYPERTENSION: ICD-10-CM

## 2025-08-19 LAB
25(OH)D3+25(OH)D2 SERPL-MCNC: 59 NG/ML (ref 30–100)
ALBUMIN SERPL-MCNC: 3.8 G/DL (ref 3.6–5.1)
ALP SERPL-CCNC: 67 U/L (ref 36–130)
ALT SERPL-CCNC: 25 U/L (ref 9–46)
ANION GAP SERPL CALCULATED.4IONS-SCNC: 10 MMOL/L (CALC) (ref 7–17)
AST SERPL-CCNC: 19 U/L (ref 10–40)
BILIRUB SERPL-MCNC: 0.6 MG/DL (ref 0.2–1.2)
BUN SERPL-MCNC: 18 MG/DL (ref 7–25)
CALCIUM SERPL-MCNC: 8.7 MG/DL (ref 8.6–10.3)
CHLORIDE SERPL-SCNC: 104 MMOL/L (ref 98–110)
CO2 SERPL-SCNC: 25 MMOL/L (ref 20–32)
CREAT SERPL-MCNC: 0.94 MG/DL (ref 0.6–1.29)
EGFRCR SERPLBLD CKD-EPI 2021: 102 ML/MIN/1.73M2
GLUCOSE SERPL-MCNC: 97 MG/DL (ref 65–99)
POTASSIUM SERPL-SCNC: 4.3 MMOL/L (ref 3.5–5.3)
PROT SERPL-MCNC: 6.4 G/DL (ref 6.1–8.1)
SODIUM SERPL-SCNC: 139 MMOL/L (ref 135–146)

## 2025-08-21 ENCOUNTER — APPOINTMENT (OUTPATIENT)
Dept: PRIMARY CARE | Facility: CLINIC | Age: 45
End: 2025-08-21
Payer: COMMERCIAL

## 2025-08-21 VITALS
WEIGHT: 232 LBS | TEMPERATURE: 98.1 F | HEART RATE: 75 BPM | OXYGEN SATURATION: 97 % | DIASTOLIC BLOOD PRESSURE: 100 MMHG | BODY MASS INDEX: 38.65 KG/M2 | HEIGHT: 65 IN | SYSTOLIC BLOOD PRESSURE: 150 MMHG | RESPIRATION RATE: 14 BRPM

## 2025-08-21 DIAGNOSIS — I10 BENIGN ESSENTIAL HYPERTENSION: Primary | ICD-10-CM

## 2025-08-21 DIAGNOSIS — G47.33 OSA (OBSTRUCTIVE SLEEP APNEA): ICD-10-CM

## 2025-08-21 DIAGNOSIS — M20.011 MALLET FINGER OF RIGHT HAND: ICD-10-CM

## 2025-08-21 DIAGNOSIS — E55.9 VITAMIN D DEFICIENCY: ICD-10-CM

## 2025-08-21 DIAGNOSIS — N20.0 KIDNEY STONES: ICD-10-CM

## 2025-08-21 DIAGNOSIS — E66.01 SEVERE OBESITY (BMI 35.0-39.9) WITH COMORBIDITY (MULTI): ICD-10-CM

## 2025-08-21 PROCEDURE — 3008F BODY MASS INDEX DOCD: CPT | Performed by: FAMILY MEDICINE

## 2025-08-21 PROCEDURE — 99213 OFFICE O/P EST LOW 20 MIN: CPT | Performed by: FAMILY MEDICINE

## 2025-08-21 PROCEDURE — 3080F DIAST BP >= 90 MM HG: CPT | Performed by: FAMILY MEDICINE

## 2025-08-21 PROCEDURE — 3077F SYST BP >= 140 MM HG: CPT | Performed by: FAMILY MEDICINE

## 2025-08-21 RX ORDER — LISINOPRIL 10 MG/1
10 TABLET ORAL DAILY
Qty: 100 TABLET | Refills: 1 | Status: CANCELLED | OUTPATIENT
Start: 2025-08-21 | End: 2026-03-09

## 2025-08-21 RX ORDER — LISINOPRIL 20 MG/1
20 TABLET ORAL DAILY
Qty: 100 TABLET | Refills: 1 | Status: SHIPPED | OUTPATIENT
Start: 2025-08-21 | End: 2026-03-09

## 2025-08-21 ASSESSMENT — ENCOUNTER SYMPTOMS
CHILLS: 0
PALPITATIONS: 0
CHEST TIGHTNESS: 0
CONFUSION: 0
SHORTNESS OF BREATH: 0
ARTHRALGIAS: 0
ABDOMINAL PAIN: 0
FEVER: 0

## 2025-08-21 ASSESSMENT — PATIENT HEALTH QUESTIONNAIRE - PHQ9
2. FEELING DOWN, DEPRESSED OR HOPELESS: NOT AT ALL
1. LITTLE INTEREST OR PLEASURE IN DOING THINGS: NOT AT ALL
SUM OF ALL RESPONSES TO PHQ9 QUESTIONS 1 AND 2: 0

## 2025-10-20 ENCOUNTER — APPOINTMENT (OUTPATIENT)
Dept: PRIMARY CARE | Facility: CLINIC | Age: 45
End: 2025-10-20
Payer: COMMERCIAL

## (undated) DEVICE — Device

## (undated) DEVICE — SYRINGE, 10 CC, LUER LOCK

## (undated) DEVICE — CATHETER, URETERAL, POLLACK, OPEN END, 5.5 FR, 70 CM

## (undated) DEVICE — GUIDEWIRE, STRAIGHT TIP, .035 DIA, 150 CM, 3 CM TIP"

## (undated) DEVICE — LUBRICANT, WATER SOLUBLE, BACTERIOSTATIC, 2 OZ, STERILE

## (undated) DEVICE — SOLUTION, IRRIGATION, STERILE WATER, 1000 ML, POUR BOTTLE

## (undated) DEVICE — GLOVE, PROTEXIS PI CLASSIC, SZ-7.0, PF, LF

## (undated) DEVICE — TUBING, SUCTION, NON-CONDUCTIVE,W/MALE CONNECT, 6MM X 12 FT, STERILE

## (undated) DEVICE — GUIDEWIRE, STRAIGHT, ZIPWIRE, .025 X 150CM, STIFF

## (undated) DEVICE — SHEATH, URETERAL ACCESS, FORTE AXP, 12 FR, 35 CM, 14-18F

## (undated) DEVICE — TOWEL PACK, STERILE, 4/PACK, BLUE

## (undated) DEVICE — BAG, PRESSURE INFUSER, 3000 CC, LF

## (undated) DEVICE — SOLUTION, IRRIGATION, USP, STERILE WATER, UROLOGICAL, 3000 ML, BAG

## (undated) DEVICE — SOLUTION, IRRIGATION, USP, SODIUM CHLORIDE 0.9%, 3000 ML

## (undated) DEVICE — CATHETER, URETERAL, CONE TIP, 8 FR, WOVEN, RT & LFT, 70 CM, STERILE

## (undated) DEVICE — GOWN, SURGICAL, UROLOGY, IMPERVIOUS, XLONG, XLARGE, DISPOSABLE

## (undated) DEVICE — SCOPE, LITHOVUE SUD ONLY, GLOBAL STANDARD

## (undated) DEVICE — IRRIGATION SET, CYSTOSCOPY, REGULATING CLAMP, STRAIGHT, 81 IN

## (undated) DEVICE — SWABSTICK, PREP, IODOPHOR, PVP, 8 IN